# Patient Record
Sex: MALE | Race: WHITE | NOT HISPANIC OR LATINO | URBAN - METROPOLITAN AREA
[De-identification: names, ages, dates, MRNs, and addresses within clinical notes are randomized per-mention and may not be internally consistent; named-entity substitution may affect disease eponyms.]

---

## 2019-07-05 ENCOUNTER — TRANSFERRED RECORDS (OUTPATIENT)
Dept: HEALTH INFORMATION MANAGEMENT | Facility: CLINIC | Age: 34
End: 2019-07-05

## 2019-07-06 ENCOUNTER — TRANSFERRED RECORDS (OUTPATIENT)
Dept: HEALTH INFORMATION MANAGEMENT | Facility: CLINIC | Age: 34
End: 2019-07-06

## 2019-07-08 ENCOUNTER — TELEPHONE (OUTPATIENT)
Dept: UROLOGY | Facility: CLINIC | Age: 34
End: 2019-07-08

## 2019-07-08 ENCOUNTER — MEDICAL CORRESPONDENCE (OUTPATIENT)
Dept: HEALTH INFORMATION MANAGEMENT | Facility: CLINIC | Age: 34
End: 2019-07-08

## 2019-07-08 DIAGNOSIS — Q64.32 CONGENITAL STRICTURE OF URETHRA: Primary | ICD-10-CM

## 2019-07-08 NOTE — TELEPHONE ENCOUNTER
Attempted to contact pt to schedule new consult for urethral stricture with Dr. Schultz next avail but was unable due to phone being disconnected or not in service

## 2019-07-09 ENCOUNTER — PRE VISIT (OUTPATIENT)
Dept: UROLOGY | Facility: CLINIC | Age: 34
End: 2019-07-09

## 2019-07-09 ENCOUNTER — TRANSFERRED RECORDS (OUTPATIENT)
Dept: HEALTH INFORMATION MANAGEMENT | Facility: CLINIC | Age: 34
End: 2019-07-09

## 2019-07-09 NOTE — TELEPHONE ENCOUNTER
MEDICAL RECORDS REQUEST   [facility]  Urology Clinic  [address] [place] [address]  PHONE: [phone]  Fax: [phone]        FUTURE VISIT INFORMATION                                                   Ricky English, : 1985 scheduled for future visit at [facility]    APPOINTMENT INFORMATION:    Date: 19 2PM     Provider:  Glenn Alvarado MD     Reason for Visit/Diagnosis: Urethral stricture     REFERRAL INFORMATION:    Referring provider: Dustin Villafana      Specialty: MD     Referring providers clinic: [facility] contact number:  [phone]    RECORDS REQUESTED FOR VISIT                                                     NOTES  STATUS/DETAILS   OFFICE NOTE from referring provider  yes   OFFICE NOTE from other specialist  yes   DISCHARGE SUMMARY from hospital  yes   DISCHARGE REPORT from the ER  yes   OPERATIVE REPORT  yes   MEDICATION LIST  yes   URETHRAL STRICTURE     RUG (IMAGES & REPORT)  in process   VCUG  (IMAGES & REPORT)  in process       PRE-VISIT CHECKLIST      Record collection complete Yes- [facility] recs scanned in epic and in CE   Images in  PACS    Appointment appropriately scheduled           (right time/right provider) Yes   MyChart activation If no, please explain: in process    Questionnaire complete If no, please explain: In process      Completed by: July Valera

## 2019-07-29 ENCOUNTER — TELEPHONE (OUTPATIENT)
Dept: UROLOGY | Facility: CLINIC | Age: 34
End: 2019-07-29

## 2019-07-29 NOTE — TELEPHONE ENCOUNTER
Health Call Center    Phone Message    May a detailed message be left on voicemail: yes    Reason for Call: Symptoms or Concerns     If patient has red-flag symptoms, warm transfer to triage line    Current symptom or concern: catheter pain around suture site.  PT was in ER yesterday    Symptoms have been present for:  1 day(s)    Has patient previously been seen for this? No            Are there any new or worsening symptoms? Yes: pain is getting worse.  PT has an appt. On 8/5/19 with Christiano but is wondering if he should be seen sooner.  Please follow up.       Action Taken: Message routed to:  [facility] urology

## 2019-07-30 ENCOUNTER — TRANSFERRED RECORDS (OUTPATIENT)
Dept: HEALTH INFORMATION MANAGEMENT | Facility: CLINIC | Age: 34
End: 2019-07-30

## 2019-07-30 ENCOUNTER — PRE VISIT (OUTPATIENT)
Dept: UROLOGY | Facility: CLINIC | Age: 34
End: 2019-07-30

## 2019-07-30 NOTE — TELEPHONE ENCOUNTER
Reason for visit: Urethral stricture consult     Relevant information: pt has a SP tube, referred by Dr. Villafana's office    Records/imaging/labs/orders: all appointments scheduled    Pt called: no need for a call    At Rooming: regular

## 2019-08-04 ASSESSMENT — ENCOUNTER SYMPTOMS
POSTURAL DYSPNEA: 0
HEMOPTYSIS: 0
SNORES LOUDLY: 0
ALTERED TEMPERATURE REGULATION: 0
HEMATURIA: 1
SHORTNESS OF BREATH: 1
WEIGHT LOSS: 0
CHILLS: 0
COUGH DISTURBING SLEEP: 0
POLYDIPSIA: 0
WHEEZING: 0
HALLUCINATIONS: 0
POLYPHAGIA: 0
WEIGHT GAIN: 0
NIGHT SWEATS: 0
FLANK PAIN: 0
FEVER: 0
DYSPNEA ON EXERTION: 0
DIFFICULTY URINATING: 1
DYSURIA: 1
FATIGUE: 0
DECREASED APPETITE: 0
INCREASED ENERGY: 0
SPUTUM PRODUCTION: 0
COUGH: 0

## 2019-08-05 ENCOUNTER — OFFICE VISIT (OUTPATIENT)
Dept: UROLOGY | Facility: CLINIC | Age: 34
End: 2019-08-05
Payer: COMMERCIAL

## 2019-08-05 ENCOUNTER — ANCILLARY PROCEDURE (OUTPATIENT)
Dept: GENERAL RADIOLOGY | Facility: CLINIC | Age: 34
End: 2019-08-05
Attending: UROLOGY
Payer: COMMERCIAL

## 2019-08-05 ENCOUNTER — ALLIED HEALTH/NURSE VISIT (OUTPATIENT)
Dept: UROLOGY | Facility: CLINIC | Age: 34
End: 2019-08-05
Payer: COMMERCIAL

## 2019-08-05 VITALS
RESPIRATION RATE: 16 BRPM | HEART RATE: 98 BPM | SYSTOLIC BLOOD PRESSURE: 143 MMHG | DIASTOLIC BLOOD PRESSURE: 89 MMHG | HEIGHT: 69 IN | WEIGHT: 202 LBS | BODY MASS INDEX: 29.92 KG/M2

## 2019-08-05 DIAGNOSIS — Q64.32 CONGENITAL STRICTURE OF URETHRA: Primary | ICD-10-CM

## 2019-08-05 DIAGNOSIS — Q64.32 CONGENITAL STRICTURE OF URETHRA: ICD-10-CM

## 2019-08-05 DIAGNOSIS — N35.011 POST-TRAUMATIC BULBOUS URETHRAL STRICTURE: ICD-10-CM

## 2019-08-05 DIAGNOSIS — N32.89 BLADDER SPASMS: Primary | ICD-10-CM

## 2019-08-05 PROBLEM — S37.30XA: Status: ACTIVE | Noted: 2019-07-06

## 2019-08-05 PROBLEM — S30.23XA: Status: ACTIVE | Noted: 2019-07-05

## 2019-08-05 PROBLEM — Z86.0100 HX OF COLONIC POLYP: Status: ACTIVE | Noted: 2017-08-17

## 2019-08-05 PROBLEM — R12 HEARTBURN: Status: ACTIVE | Noted: 2019-02-01

## 2019-08-05 PROBLEM — S39.83XA PELVIC STRADDLE INJURY: Status: ACTIVE | Noted: 2019-07-05

## 2019-08-05 RX ORDER — OMEPRAZOLE 40 MG/1
CAPSULE, DELAYED RELEASE ORAL
Refills: 3 | COMMUNITY
Start: 2019-06-17

## 2019-08-05 RX ORDER — SODIUM PHOSPHATE,MONO-DIBASIC 19G-7G/118
ENEMA (ML) RECTAL
COMMUNITY
Start: 2017-01-01

## 2019-08-05 RX ORDER — LIDOCAINE HYDROCHLORIDE 20 MG/ML
10 JELLY TOPICAL ONCE
Status: COMPLETED | OUTPATIENT
Start: 2019-08-05 | End: 2019-08-05

## 2019-08-05 RX ORDER — MULTIVITAMIN
TABLET ORAL
COMMUNITY
Start: 2017-01-01

## 2019-08-05 RX ORDER — SECUKINUMAB 150 MG/ML
INJECTION SUBCUTANEOUS
Refills: 10 | COMMUNITY
Start: 2019-06-18

## 2019-08-05 RX ORDER — CHLORAL HYDRATE 500 MG
CAPSULE ORAL
COMMUNITY
Start: 2017-01-01

## 2019-08-05 RX ORDER — TOLTERODINE TARTRATE 1 MG/1
1 TABLET, EXTENDED RELEASE ORAL 2 TIMES DAILY PRN
Qty: 120 TABLET | Refills: 0 | Status: SHIPPED | OUTPATIENT
Start: 2019-08-05 | End: 2019-09-23

## 2019-08-05 RX ADMIN — LIDOCAINE HYDROCHLORIDE 10 ML: 20 JELLY TOPICAL at 10:48

## 2019-08-05 ASSESSMENT — PATIENT HEALTH QUESTIONNAIRE - PHQ9
SUM OF ALL RESPONSES TO PHQ QUESTIONS 1-9: 3
SUM OF ALL RESPONSES TO PHQ QUESTIONS 1-9: 3

## 2019-08-05 ASSESSMENT — MIFFLIN-ST. JEOR: SCORE: 1836.65

## 2019-08-05 NOTE — PROGRESS NOTES
Urology Clinic    Glenn Schultz MD  Date of Service: 2019     Name: Ricky English  MRN: [id number]  Age: 34 year old  : 1985  Referring provider: Ash Villafana     Assessment and Plan:    Assessment:   A 34 year old-year-old gentleman with history of straddle injury sustained on 19 with resultant bulbar urethra distraction injury s/p SPT placement after failed catheterization attempt.     Plan:   - Will continue SPT and plan for urethroplasty to repair bulbar urethral injury. Recommended delaying any definitive management for ~ 3 months from initial injury to optimize results.   - Patient does report history of psoriatic arthritis for which he takes bi-weekly biologic medication. He has been holding this since time of injury and we recommend continuing to hold this medication. Patient will discuss this with rheumatologist.   - Patient reassured that he should not have any long term issues with fertility due to this injury and planned repair.   - Provided patient with anti-cholinergic Rx for reported bladder spasms.       Risks, benefits, and alternatives of repeat urethrotomy versus urethroplasty were described.  He wishes to proceed with urethroplasty.  He understands the risks to include but not be limited to bleeding, infection, penile pain or numbness, scrotal pain or numbness, lower extremity neuropathy, change in erectile function, change in ejaculatory function, and need for additional procedures. He understands the success rate of urethroplasty to be about 99% for this indication.     Additional Risk Factors in this case/surgery: None    Bryson Reynolds MD   Urology Resident, PGY-4     =======================================================  As the attending surgeon I, Glenn Schultz, interviewed and examined the patient. The plan was developed between me and the patient. My findings and plan are as stated by the resident.    Glenn Schultz,  MD      ---------------------------------------------------------------------------------------------------------------------    Chief Complaint:   Urethral stricture     HPI:   Mr. English is a 34 year old-year-old man seen in consultation from Dr. Villafana for a bulbar urethral injury. The patient was involved in an accident on 7/6/19 where he fell onto a floor joist on a deck he was working on. He did not have any significant hematoma or bony injury initially however he was unable to void and presented to the ED for further hospitalization. There was an attempt made to catheterize patient under cystoscopy however this was unsuccessful and a SPT was placed. He subsequently developed perineal and left thigh hematoma which has since largely resolved. He denies issues with SPT drainage but notes irritation due to suture around catheter (16-Fr silicone catheter). He reports occasional hematuria and intermittent bladder spasms/leakage around SPT. With regards to his erectile function, he reports adequate erection however his glans does not become engorged. Of note, he does report history of apparent weak stream prior to injury.  Of note, patient also has history of psoriatic arthritis for which he takes a bi-weekly biologic medication which has been held since time of injury.    Etiology:  He denies a history of sexually transmitted diseases. He confirms a history of straddle injury as above. He denies a history of pelvic fracture. He denies a history of urethral catheterization or instrumentation.    REVIEW OF QUESTIONNAIRES:  Questionnaires reviewed. See flowsheet for details.      Review of Systems:   Pertinent items are noted in HPI or as below, remainder of complete ROS is negative.      Active Medications:     Current Outpatient Medications:      cholecalciferol (VITAMIN D3) 400 unit (10 mcg) TABS tablet, , Disp: , Rfl:      fish oil-omega-3 fatty acids 1000 MG capsule, , Disp: , Rfl:       "glucosamine-chondroitin (GLUCOSAMINE CHONDR COMPLEX) 500-400 MG CAPS per capsule, , Disp: , Rfl:      Multiple Vitamin (DAILY VITAMINS) TABS, , Disp: , Rfl:      omeprazole (PRILOSEC) 40 MG DR capsule, TK 1 C PO QD IN THE MORNING OES, Disp: , Rfl: 3     tolterodine (DETROL) 1 MG tablet, Take 1 tablet (1 mg) by mouth 2 times daily as needed for bladder spasms, Disp: 120 tablet, Rfl: 0     COSENTYX SENSOREADY 300 DOSE 150 MG/ML Sensoready pen, , Disp: , Rfl: 10  No current facility-administered medications for this visit.       Allergies:   Ceclor [cefaclor] and Tegretol [carbamazepine]      Past Medical History:  Heartburn   Colonic polyp   Pelvic straddle injury   Perineal hematoma   Psoriatic arthritis   Urethral injury      Past Surgical History:  Cystoscopy, SPT placement 07/06/2019     Family History:   No past pertinent family history.     Social History:   Works as a   , no children      Physical Exam:   General:  Well-dressed, well-nourished man in no acute distress.  Vitals: BP (!) 143/89   Pulse 98   Resp 16   Ht 1.753 m (5' 9\")   Wt 91.6 kg (202 lb)   BMI 29.83 kg/m   Estimated body mass index is 29.83 kg/m  as calculated from the following:    Height as of this encounter: 1.753 m (5' 9\").    Weight as of this encounter: 91.6 kg (202 lb).  Eyes: anicteric, EOMI  Lymph: No cervical, supraclavicular or axillary lymphadenopathy  Lungs:  No respiratory distress.  Heart:  Regular rate and rhythm.     Abdomen:  mildly obese, soft, nontender, without masses.  There are not surgical scars.    :  Penis is circumcised. Meatal stenosis is absent, penile plaques are absent. Some perineal hematoma and associated hematoma at the left thigh. Testes are 10 mL bilaterally without masses.  Rectal examination was not done.   Musculoskeletal:  Motor strength is excellent throughout.     Neurologic:  Grossly nonfocal.     Back: Flanks are nontender.    Imaging:   Retrograde urethrogram and voiding " cystourethrogram were performed earlier and the images were independently interpreted by me and are reviewed with the patient.  These demonstrate a urethral distraction injury at the mid to proximal bulbar urethra. The bladder contour is normal without diverticulae.    Review of Office Studies:    Review of RUG/VCUG with above results    Review of Outside Records:  I reviewed outside records for 10 minutes.  Findings include:  Attempt at urethral catheterization and subsequent placement of SPT on 7/6/19.     Scribe Disclosure:    I, Isadora Gordon, a scribe, prepared the chart for today's encounter.

## 2019-08-05 NOTE — LETTER
2019       RE: Ricky English  [address]  [facility] [address]     Dear Colleague,    Thank you for referring your patient, Ricky English, to the [facility] UROLOGY AND INST FOR PROSTATE AND UROLOGIC CANCERS at [facility]. Please see a copy of my visit note below.      Urology Clinic    Glenn Schultz MD  Date of Service: 2019     Name: Ricky Engilsh  MRN: [id number]  Age: 34 year old  : 1985  Referring provider: Ash Villafana     Assessment and Plan:    Assessment:   A 34 year old-year-old gentleman with history of straddle injury sustained on 19 with resultant bulbar urethra distraction injury s/p SPT placement after failed catheterization attempt.     Plan:   - Will continue SPT and plan for urethroplasty to repair bulbar urethral injury. Recommended delaying any definitive management for ~ 3 months from initial injury to optimize results.   - Patient does report history of psoriatic arthritis for which he takes bi-weekly biologic medication. He has been holding this since time of injury and we recommend continuing to hold this medication. Patient will discuss this with rheumatologist.   - Patient reassured that he should not have any long term issues with fertility due to this injury and planned repair.   - Provided patient with anti-cholinergic Rx for reported bladder spasms.       Risks, benefits, and alternatives of repeat urethrotomy versus urethroplasty were described.  He wishes to proceed with urethroplasty.  He understands the risks to include but not be limited to bleeding, infection, penile pain or numbness, scrotal pain or numbness, lower extremity neuropathy, change in erectile function, change in ejaculatory function, and need for additional procedures. He understands the success rate of urethroplasty to be about 99% for this indication.     Additional Risk Factors in this case/surgery: None    Bryson Reynolds MD    Urology Resident, PGY-4     =======================================================  As the attending surgeon I, Glenn Schultz, interviewed and examined the patient. The plan was developed between me and the patient. My findings and plan are as stated by the resident.    Glenn Schultz MD      ---------------------------------------------------------------------------------------------------------------------    Chief Complaint:   Urethral stricture     HPI:   Mr. English is a 34 year old-year-old man seen in consultation from Dr. Villafana for a bulbar urethral injury. The patient was involved in an accident on 7/6/19 where he fell onto a floor joist on a deck he was working on. He did not have any significant hematoma or bony injury initially however he was unable to void and presented to the ED for further hospitalization. There was an attempt made to catheterize patient under cystoscopy however this was unsuccessful and a SPT was placed. He subsequently developed perineal and left thigh hematoma which has since largely resolved. He denies issues with SPT drainage but notes irritation due to suture around catheter (16-Fr silicone catheter). He reports occasional hematuria and intermittent bladder spasms/leakage around SPT. With regards to his erectile function, he reports adequate erection however his glans does not become engorged. Of note, he does report history of apparent weak stream prior to injury.  Of note, patient also has history of psoriatic arthritis for which he takes a bi-weekly biologic medication which has been held since time of injury.    Etiology:  He denies a history of sexually transmitted diseases. He confirms a history of straddle injury as above. He denies a history of pelvic fracture. He denies a history of urethral catheterization or instrumentation.    REVIEW OF QUESTIONNAIRES:  Questionnaires reviewed. See flowsheet for details.      Review of Systems:   Pertinent items are  "noted in HPI or as below, remainder of complete ROS is negative.      Active Medications:     Current Outpatient Medications:      cholecalciferol (VITAMIN D3) 400 unit (10 mcg) TABS tablet, , Disp: , Rfl:      fish oil-omega-3 fatty acids 1000 MG capsule, , Disp: , Rfl:      glucosamine-chondroitin (GLUCOSAMINE CHONDR COMPLEX) 500-400 MG CAPS per capsule, , Disp: , Rfl:      Multiple Vitamin (DAILY VITAMINS) TABS, , Disp: , Rfl:      omeprazole (PRILOSEC) 40 MG DR capsule, TK 1 C PO QD IN THE MORNING OES, Disp: , Rfl: 3     tolterodine (DETROL) 1 MG tablet, Take 1 tablet (1 mg) by mouth 2 times daily as needed for bladder spasms, Disp: 120 tablet, Rfl: 0     COSENTYX SENSOREADY 300 DOSE 150 MG/ML Sensoready pen, , Disp: , Rfl: 10  No current facility-administered medications for this visit.       Allergies:   Ceclor [cefaclor] and Tegretol [carbamazepine]      Past Medical History:  Heartburn   Colonic polyp   Pelvic straddle injury   Perineal hematoma   Psoriatic arthritis   Urethral injury      Past Surgical History:  Cystoscopy, SPT placement 07/06/2019     Family History:   No past pertinent family history.     Social History:   Works as a   , no children      Physical Exam:   General:  Well-dressed, well-nourished man in no acute distress.  Vitals: BP (!) 143/89   Pulse 98   Resp 16   Ht 1.753 m (5' 9\")   Wt 91.6 kg (202 lb)   BMI 29.83 kg/m    Estimated body mass index is 29.83 kg/m  as calculated from the following:    Height as of this encounter: 1.753 m (5' 9\").    Weight as of this encounter: 91.6 kg (202 lb).  Eyes: anicteric, EOMI  Lymph: No cervical, supraclavicular or axillary lymphadenopathy  Lungs:  No respiratory distress.  Heart:  Regular rate and rhythm.     Abdomen:  mildly obese, soft, nontender, without masses.  There are not surgical scars.    :  Penis is circumcised. Meatal stenosis is absent, penile plaques are absent. Some perineal hematoma and associated " hematoma at the left thigh. Testes are 10 mL bilaterally without masses.  Rectal examination was not done.   Musculoskeletal:  Motor strength is excellent throughout.     Neurologic:  Grossly nonfocal.     Back: Flanks are nontender.    Imaging:   Retrograde urethrogram and voiding cystourethrogram were performed earlier and the images were independently interpreted by me and are reviewed with the patient.  These demonstrate a urethral distraction injury at the mid to proximal bulbar urethra. The bladder contour is normal without diverticulae.    Review of Office Studies:    Review of RUG/VCUG with above results    Review of Outside Records:  I reviewed outside records for 10 minutes.  Findings include:  Attempt at urethral catheterization and subsequent placement of SPT on 7/6/19.     Scribe Disclosure:    I, Isadora Gordon, a scribe, prepared the chart for today's encounter.       Again, thank you for allowing me to participate in the care of your patient.      Sincerely,    Glenn Schultz MD

## 2019-08-05 NOTE — PROGRESS NOTES
Pre Op Teaching Flowsheet       Pre and Post op Patient Education  Relevant Diagnosis:  Urethral stricture  Teaching Topic:  Pre and post op teaching for Posterior urethroplasty (no buccal graft planned)  Person Involved in teaching:  Ricky English      Motivation Level:  Asks Questions: Yes  Eager to Learn:  Yes  Cooperative: Yes  Receptive (willing/able to accept information):  Yes  Patient demonstrates understanding of the following:  Date and time of surgery:  10/2/19  Location of surgery: [facility] [place]- 5th Floor  History and Physical and any other testing necessary prior to surgery: Yes  Required time line for completion of History and Physical and any pre-op testing: Yes    NPO Guidelines: NPO per Anesthesia Guidelines    Patient demonstrates understanding of the following:  Patient understands the need for a responsible adult to drive them home and someone to stay with them for the first 24 hours post-operatively: YES Wife  Pre-op bowel prep: No, not needed  Pre-op showering/scrub information with Hibiclens Soap: Yes  Medications to take the day of surgery:  Per PCP  Blood thinner medications discussed and when to stop (if applicable):  Yes  Diabetes medication management (if applicable):  N/A  Discussed pain control after surgery: pain scale, pain medications and pain management techniques  Infection Prevention: Patient demonstrates understanding of the following:  Patient instructed on hand hygiene:  Yes  Surgical procedure site care taught: Yes  Signs and symptoms of infection taught:  Yes  Wound care will be taught at the time of discharge.  Central venous catheter care will be taught at the time of discharge (if applicable).    Post-op follow-up:  Discussed how to contact the hospital, nurse, and clinic scheduling staff if necessary.    Instructional materials used/given/mailed:  [facility] Booklet, post op teaching sheet, Map, Soap, and arrival/location  information.    Surgical instructions given to patient in clinic: Yes.    Instructional Materials given:  Before your surgery packet , Medications to avoid before surgery , Showering or Bathing instructions before surgery  and What to expect after surgery    Post-op appointment/testing scheduled per MD orders: Yes    Total time with patient: 10 minutes    Katelyn Huerta RN, BSN  Urology Care Coordinator

## 2019-08-07 ENCOUNTER — PRE VISIT (OUTPATIENT)
Dept: UROLOGY | Facility: CLINIC | Age: 34
End: 2019-08-07

## 2019-08-07 NOTE — TELEPHONE ENCOUNTER
Reason for Visit: SP tube exchange    Diagnosis: Urethral stricture     Orders/Procedures/Records: Records available    Contact Patient: N/A    Rooming Requirements: Catheter supplies      Ibeth Ferreira  08/07/19  6:48 AM

## 2019-08-12 ENCOUNTER — TELEPHONE (OUTPATIENT)
Dept: UROLOGY | Facility: CLINIC | Age: 34
End: 2019-08-12

## 2019-08-12 NOTE — TELEPHONE ENCOUNTER
Patient states that his symptoms was chills on 8/9/2019 that subsided by 8/10/2019. He states that he had 4 alcoholic drinks over the weekend. His urine is darker and smelly. Patient was instructed to push fluids,watch for blood in his urine and not to have any alcoholic beverages (per Katelyn Huerta CC for Dr. Glenn Schultz). He will call us back if his symptoms worsen. A fever over 100.0 or if he see blood in his urine.Patient agreed with the plan.      Lashawn Marin MA

## 2019-08-12 NOTE — TELEPHONE ENCOUNTER
[facility] Call Center    Phone Message    May a detailed message be left on voicemail: yes    Reason for Call: Symptoms or Concerns     If patient has red-flag symptoms, warm transfer to triage line    Current symptom or concern: The pt thinks he might have a UTI but wants to speak with a nurse to discuss. Thanks.    Symptoms have been present for: ? hour(s)    Has patient previously been seen for this? No    By : Antoine    Date: 8.5.19    Are there any new or worsening symptoms? Yes: ?Pt wouldn't say      Action Taken: Message routed to:  [facility]

## 2019-08-15 ENCOUNTER — TELEPHONE (OUTPATIENT)
Dept: UROLOGY | Facility: CLINIC | Age: 34
End: 2019-08-15

## 2019-08-15 NOTE — TELEPHONE ENCOUNTER
Patient called and was having alittle blood this morning when he was getting ready for surgery.  He has a new supra pubic catheter and has many good questions about care and what his body is going thru.  He states seeing more sediment and told him if it becomes a problem and causes many episodes of spasm we would probably teach him irrigation Adilene Albert LPN Staff Nurse

## 2019-08-15 NOTE — TELEPHONE ENCOUNTER
Health Call Center    Phone Message    May a detailed message be left on voicemail: yes    Reason for Call: Other: Patient is calling back with a follow up.  He is still having symptoms and is requesting a call back from the nurse.  He states has seen a little blood.       Action Taken: Message routed to:  [facility] Urology

## 2019-08-18 NOTE — PROGRESS NOTES
"Reason for visit:  Follow up of urethral injury, SP change    HPI:  Mr. English is a 34M involved in an accident on 7/6/19 where he fell. He did not have any significant hematoma or bony injury initially however he was unable to void.  Presented to outside hospital ER.   Attempt made to catheterize patient with aid of cystoscopy however this was unsuccessful and a SPT was placed.   He subsequently developed perineal and left thigh hematoma.   Referred to Dr. Schultz 8/5/19  He is scheduled for urethroplasty in October with Dr. Schultz.  Here today for exchange of suprapubic tube.   No issues with SP tube.       Past medical, surgical, family, social, allergic, and medication history reviewed  ROS -see hpi otherwise rest is negative     OBJECTIVE:  Vitals:    08/19/19 1426   BP: 119/83   Pulse: 90   Weight: 91.6 kg (202 lb)   Height: 1.753 m (5' 9\")     EXAM:  General:  Well-dressed, well-nourished man in no acute distress  Eyes: anicteric, EOMI  Lungs:  No respiratory distress.  Heart:  Regular rate   Abdomen:  not obese, soft, nontender, without masses.     : SP site c/d/i, 16F espinoza in SP site  Musculoskeletal:  Motor strength is excellent throughout.     Neurologic:  Grossly nonfocal.     Back: Flanks are nontender.    PROCEDURE:  Patients abdomen was prepped and draped in sterile fashion. An angled glide wire was passed through old catheter into the bladder. The old catheter was removed and a new 16F Jackson catheter was passed over the wire into the bladder with return of yellow urine. The balloon was inflated with 10cc sterile water. The bladder was irrigated with sterile saline via 60cc syringe until clear of debris. Tolerated well.     Assessment/Plan: Ricky English is a 34 year old male with urethral distraction after a fall 7/6/19. Could not perform primary realignment at outside facility so SP tube placed. Planning for urethroplasty in October.     --SP changed today  --RTC in 1 month for SP change " prior to urethroplasty and urine culture      Luz Elena Chambers MD  Reconstructive Urology Fellow

## 2019-08-19 ENCOUNTER — OFFICE VISIT (OUTPATIENT)
Dept: UROLOGY | Facility: CLINIC | Age: 34
End: 2019-08-19
Payer: COMMERCIAL

## 2019-08-19 VITALS
SYSTOLIC BLOOD PRESSURE: 119 MMHG | HEART RATE: 90 BPM | HEIGHT: 69 IN | WEIGHT: 202 LBS | BODY MASS INDEX: 29.92 KG/M2 | DIASTOLIC BLOOD PRESSURE: 83 MMHG

## 2019-08-19 DIAGNOSIS — Z93.59 SUPRAPUBIC CATHETER (H): ICD-10-CM

## 2019-08-19 DIAGNOSIS — N35.011 POST-TRAUMATIC BULBOUS URETHRAL STRICTURE: Primary | ICD-10-CM

## 2019-08-19 ASSESSMENT — PAIN SCALES - GENERAL: PAINLEVEL: NO PAIN (0)

## 2019-08-19 ASSESSMENT — MIFFLIN-ST. JEOR: SCORE: 1836.65

## 2019-08-19 NOTE — LETTER
"8/19/2019       RE: Ricky English  [address]  [facility] [address]     Dear Colleague,    Thank you for referring your patient, Ricky English, to the [facility] UROLOGY AND INST FOR PROSTATE AND UROLOGIC CANCERS at [facility]. Please see a copy of my visit note below.    Reason for visit:  Follow up of urethral injury, SP change    HPI:  Mr. English is a 34M involved in an accident on 7/6/19 where he fell. He did not have any significant hematoma or bony injury initially however he was unable to void.  Presented to outside hospital ER.   Attempt made to catheterize patient with aid of cystoscopy however this was unsuccessful and a SPT was placed.   He subsequently developed perineal and left thigh hematoma.   Referred to Dr. Schultz 8/5/19  He is scheduled for urethroplasty in October with Dr. Schultz.  Here today for exchange of suprapubic tube.   No issues with SP tube.       Past medical, surgical, family, social, allergic, and medication history reviewed  ROS -see hpi otherwise rest is negative     OBJECTIVE:  Vitals:    08/19/19 1426   BP: 119/83   Pulse: 90   Weight: 91.6 kg (202 lb)   Height: 1.753 m (5' 9\")     EXAM:  General:  Well-dressed, well-nourished man in no acute distress  Eyes: anicteric, EOMI  Lungs:  No respiratory distress.  Heart:  Regular rate   Abdomen:   not obese, soft, nontender, without masses.     : SP site c/d/i, 16F espinoza in SP site  Musculoskeletal:  Motor strength is excellent throughout.     Neurologic:  Grossly nonfocal.     Back: Flanks are nontender.    PROCEDURE:  Patients abdomen was prepped and draped in sterile fashion. An angled glide wire was passed through old catheter into the bladder. The old catheter was removed and a new 16F [place] catheter was passed over the wire into the bladder with return of yellow urine. The balloon was inflated with 10cc sterile water. The bladder was irrigated with sterile saline via 60cc syringe " until clear of debris. Tolerated well.     Assessment/Plan: Ricky English is a 34 year old male with urethral distraction after a fall 7/6/19. Could not perform primary realignment at outside facility so SP tube placed. Planning for urethroplasty in October.     --SP changed today  --RTC in 1 month for SP change prior to urethroplasty and urine culture      Luz Elena Chambers MD  Reconstructive Urology Fellow    Again, thank you for allowing me to participate in the care of your patient.      Sincerely,    Nuha Chambers MD

## 2019-08-19 NOTE — NURSING NOTE
"Chief Complaint   Patient presents with     Follow Up     SP tube exchange       Blood pressure 119/83, pulse 90, height 1.753 m (5' 9\"), weight 91.6 kg (202 lb). Body mass index is 29.83 kg/m .    Patient Active Problem List   Diagnosis     Heartburn     Hx of colonic polyp     Pelvic straddle injury     Perineal hematoma     Psoriatic arthritis (H)     Urethral injury, closed, initial encounter       Allergies   Allergen Reactions     Ceclor [Cefaclor]      Tegretol [Carbamazepine]        Current Outpatient Medications   Medication Sig Dispense Refill     cholecalciferol (VITAMIN D3) 400 unit (10 mcg) TABS tablet        COSENTYX SENSOREADY 300 DOSE 150 MG/ML Sensoready pen   10     fish oil-omega-3 fatty acids 1000 MG capsule        glucosamine-chondroitin (GLUCOSAMINE CHONDR COMPLEX) 500-400 MG CAPS per capsule        Multiple Vitamin (DAILY VITAMINS) TABS        omeprazole (PRILOSEC) 40 MG DR capsule TK 1 C PO QD IN THE MORNING OES  3     tolterodine (DETROL) 1 MG tablet Take 1 tablet (1 mg) by mouth 2 times daily as needed for bladder spasms 120 tablet 0       Social History     Tobacco Use     Smoking status: Never Smoker     Smokeless tobacco: Never Used   Substance Use Topics     Alcohol use: None     Drug use: None       Ibeth Ferreira, EMT  8/19/2019  2:28 PM      The following medication was given:     MEDICATION:  Ciprofloxacin  ROUTE: PO  SITE: mouth  DOSE: 500 mg  LOT #: [id number]  : [employer]  EXPIRATION DATE: 10/2020  NDC#: 01 003 [id number] 11 2   Was there drug waste? No    Prior to med admin, verified patient identity using patient's name and date of birth.  Due to med administration, patient instructed to remain in clinic for 15 minutes  afterwards, and to report any adverse reaction to me immediately.    Drug Amount Wasted:  None.    Ibeth Ferreira, TWIN  August 19, 2019       "

## 2019-08-19 NOTE — PATIENT INSTRUCTIONS
Please schedule an appointment in one month for an SP tube change.       It was a pleasure meeting with you today.  Thank you for allowing me and my team the privilege of caring for you today.  YOU are the reason we are here, and I truly hope we provided you with the excellent service you deserve.  Please let us know if there is anything else we can do for you so that we can be sure you are leaving completely satisfied with your care experience.

## 2019-09-18 ENCOUNTER — TELEPHONE (OUTPATIENT)
Dept: UROLOGY | Facility: CLINIC | Age: 34
End: 2019-09-18

## 2019-09-18 NOTE — TELEPHONE ENCOUNTER
Health Call Center    Phone Message    May a detailed message be left on voicemail: yes    Reason for Call: Other: PT is wanting to know from Dr. Glenn Kim for medication of DETROL 1mg tab, pt is experincing spams still, medication works for up to 6 hrs or so, after that the spams come back and they are getting worse, Pt is wondeirng what Dr. Glenn Kim  would recommend to help with this issue, or could pt increase the doseage of medicaton from 2xs a day to 4x a day? Please call the pt back, thank you     Action Taken: Message routed to:  [facility] ([facility] urology

## 2019-09-19 NOTE — TELEPHONE ENCOUNTER
Left patient message to call back to discuss.    Katelyn Huerta, RN, BSN  Care Coordinator- Reconstructive Urology

## 2019-09-20 ENCOUNTER — TELEPHONE (OUTPATIENT)
Dept: UROLOGY | Facility: CLINIC | Age: 34
End: 2019-09-20

## 2019-09-20 NOTE — TELEPHONE ENCOUNTER
----- Message from Nuha Chambers MD sent at 9/20/2019 11:12 AM CDT -----  Regarding: RE: Bladder Spasms  Can increase his Detrol dose.     ----- Message -----  From: Katelyn Huerta RN  Sent: 9/19/2019   1:56 PM  To: Nuha Chambers MD  Subject: Bladder Spasms                                   Hi Dr. Chambers,    Pt has SP in and he continues to have intermittent bladder spasms while on Detrol 1 mg BID.  He's scheduled for his urethroplasty 10/2, would you like to increase his Detrol or try him on oxybutynin?    Thanks,  Katelyn

## 2019-09-20 NOTE — TELEPHONE ENCOUNTER
Called patient and left message to increase his detrol to 2 mg twice per day to help with bladder spasms  He was given 120 pills so told him to call if he needs more called into his pharmacy Adilene Albert LPN Staff Nurse

## 2019-09-23 ENCOUNTER — HOSPITAL ENCOUNTER (OUTPATIENT)
Dept: GENERAL RADIOLOGY | Facility: CLINIC | Age: 34
End: 2019-09-23
Attending: UROLOGY
Payer: COMMERCIAL

## 2019-09-23 ENCOUNTER — ALLIED HEALTH/NURSE VISIT (OUTPATIENT)
Dept: UROLOGY | Facility: CLINIC | Age: 34
End: 2019-09-23
Payer: COMMERCIAL

## 2019-09-23 ENCOUNTER — HOSPITAL ENCOUNTER (OUTPATIENT)
Dept: GENERAL RADIOLOGY | Facility: CLINIC | Age: 34
Discharge: HOME | End: 2019-09-23
Attending: UROLOGY | Admitting: UROLOGY
Payer: COMMERCIAL

## 2019-09-23 DIAGNOSIS — N35.011 POST-TRAUMATIC BULBOUS URETHRAL STRICTURE: Primary | ICD-10-CM

## 2019-09-23 DIAGNOSIS — Z93.59 SUPRAPUBIC CATHETER (H): ICD-10-CM

## 2019-09-23 DIAGNOSIS — Q64.32 CONGENITAL STRICTURE OF URETHRA: ICD-10-CM

## 2019-09-23 DIAGNOSIS — N32.89 BLADDER SPASMS: ICD-10-CM

## 2019-09-23 LAB
ALBUMIN UR-MCNC: 100 MG/DL
APPEARANCE UR: ABNORMAL
BACTERIA #/AREA URNS HPF: ABNORMAL /HPF
BILIRUB UR QL STRIP: NEGATIVE
COLOR UR AUTO: YELLOW
GLUCOSE UR STRIP-MCNC: NEGATIVE MG/DL
HGB UR QL STRIP: ABNORMAL
KETONES UR STRIP-MCNC: NEGATIVE MG/DL
LEUKOCYTE ESTERASE UR QL STRIP: ABNORMAL
MUCOUS THREADS #/AREA URNS LPF: PRESENT /LPF
NITRATE UR QL: NEGATIVE
PH UR STRIP: 9 PH (ref 5–7)
RBC #/AREA URNS AUTO: 26 /HPF (ref 0–2)
SOURCE: ABNORMAL
SP GR UR STRIP: 1.02 (ref 1–1.03)
UROBILINOGEN UR STRIP-MCNC: 0 MG/DL (ref 0–2)
WBC #/AREA URNS AUTO: >182 /HPF (ref 0–5)
WBC CLUMPS #/AREA URNS HPF: PRESENT /HPF

## 2019-09-23 PROCEDURE — 51600 INJECTION FOR BLADDER X-RAY: CPT

## 2019-09-23 PROCEDURE — 25000125 ZZHC RX 250: Performed by: RADIOLOGY

## 2019-09-23 PROCEDURE — 25500064 ZZH RX 255 OP 636: Performed by: RADIOLOGY

## 2019-09-23 PROCEDURE — 74450 X-RAY URETHRA/BLADDER: CPT

## 2019-09-23 RX ORDER — IOPAMIDOL 510 MG/ML
100 INJECTION, SOLUTION INTRAVASCULAR ONCE
Status: COMPLETED | OUTPATIENT
Start: 2019-09-23 | End: 2019-09-23

## 2019-09-23 RX ORDER — LIDOCAINE HYDROCHLORIDE 20 MG/ML
JELLY TOPICAL ONCE
Status: COMPLETED | OUTPATIENT
Start: 2019-09-23 | End: 2019-09-23

## 2019-09-23 RX ORDER — IOPAMIDOL 612 MG/ML
50 INJECTION, SOLUTION INTRAVASCULAR ONCE
Status: COMPLETED | OUTPATIENT
Start: 2019-09-23 | End: 2019-09-23

## 2019-09-23 RX ORDER — TOLTERODINE TARTRATE 1 MG/1
1 TABLET, EXTENDED RELEASE ORAL 2 TIMES DAILY PRN
Qty: 120 TABLET | Refills: 0 | Status: SHIPPED | OUTPATIENT
Start: 2019-09-23 | End: 2019-12-02

## 2019-09-23 RX ORDER — CIPROFLOXACIN 500 MG/1
500 TABLET, FILM COATED ORAL ONCE
Status: COMPLETED | OUTPATIENT
Start: 2019-09-23 | End: 2019-09-23

## 2019-09-23 RX ADMIN — IOPAMIDOL 200 ML: 510 INJECTION, SOLUTION INTRAVASCULAR at 08:48

## 2019-09-23 RX ADMIN — LIDOCAINE HYDROCHLORIDE 4 ML: 20 JELLY TOPICAL at 08:45

## 2019-09-23 RX ADMIN — CIPROFLOXACIN 500 MG: 500 TABLET, FILM COATED ORAL at 10:05

## 2019-09-23 RX ADMIN — IOPAMIDOL 15 ML: 612 INJECTION, SOLUTION INTRAVENOUS at 08:44

## 2019-09-23 NOTE — PATIENT INSTRUCTIONS
Urine sent for UA/UC today.    Surgery is scheduled with Dr. Schultz on 10-2-19.    It was a pleasure meeting with you today.  Thank you for allowing me and my team the privilege of caring for you today.  YOU are the reason we are here, and I truly hope we provided you with the excellent service you deserve.  Please let us know if there is anything else we can do for you so that we can be sure you are leaving completely satisfied with your care experience.        Kayla Prabhakar, CMA

## 2019-09-23 NOTE — PROGRESS NOTES
Chief Complaint   Patient presents with     Allied Health Visit     SPT change       Patient Active Problem List   Diagnosis     Heartburn     Hx of colonic polyp     Pelvic straddle injury     Perineal hematoma     Psoriatic arthritis (H)     Urethral injury, closed, initial encounter       Allergies   Allergen Reactions     Ceclor [Cefaclor]      Tegretol [Carbamazepine]        Current Outpatient Medications   Medication Sig Dispense Refill     cholecalciferol (VITAMIN D3) 400 unit (10 mcg) TABS tablet        COSENTYX SENSOREADY 300 DOSE 150 MG/ML Sensoready pen   10     fish oil-omega-3 fatty acids 1000 MG capsule        glucosamine-chondroitin (GLUCOSAMINE CHONDR COMPLEX) 500-400 MG CAPS per capsule        Multiple Vitamin (DAILY VITAMINS) TABS        omeprazole (PRILOSEC) 40 MG DR capsule TK 1 C PO QD IN THE MORNING OES  3     tolterodine (DETROL) 1 MG tablet Take 1 tablet (1 mg) by mouth 2 times daily as needed for bladder spasms 120 tablet 0       Social History     Tobacco Use     Smoking status: Never Smoker     Smokeless tobacco: Never Used   Substance Use Topics     Alcohol use: Not on file     Drug use: Not on file       Ricky English comes into clinic today at the request of Dr. Glenn Schultz for SP catheter change.    This service provided today was under the direct supervision of Dr. Del Crystal, who was available if needed.    Ricky English presents to clinic for scheduled [Yes] catheter exchange.  Order has been verified: Yes.    Removal:  16 Fr straight tipped latex espinoza catheter removed from suprapubic meatus without difficulty.    Insertion:  16 Fr straight tipped latex espinoza catheter inserted into suprapubic meatus in the usual sterile fashion without difficulty.  Balloon filled with 7 mL sterile H2O.  Received 25 ml yellow urine output.   Catheter secured in place with leg strap: Yes.     One Cipro 500 mg given per protocol: Yes.   The following medication was given:      MEDICATION:  Ciprofloxacin  ROUTE: PO  SITE: Medication was given orally   DOSE: 500 mg  LOT #: [id number]  : [employer]  EXPIRATION DATE: 10/2020  NDC#: [id number]   Was there drug waste? No    Prior to medication administration, verified patient identity using patient's name and date of birth.  Due to medication administration, patient instructed to remain in clinic for 15 minutes  afterwards, and to report any adverse reaction to me immediately.    Drug Amount Wasted:  None.  Vial/Syringe: Single dose vial    Patient did tolerate procedure well.    Patient instructed as to where to call or go for pain, fever, leakage, or decreased urine flow.       MAREK Galeano  9/23/2019  10:03 AM

## 2019-09-26 LAB
BACTERIA SPEC CULT: ABNORMAL
Lab: ABNORMAL
SPECIMEN SOURCE: ABNORMAL

## 2019-09-27 ENCOUNTER — PATIENT OUTREACH (OUTPATIENT)
Dept: UROLOGY | Facility: CLINIC | Age: 34
End: 2019-09-27

## 2019-09-27 DIAGNOSIS — N39.0 URINARY TRACT INFECTION: Primary | ICD-10-CM

## 2019-09-27 RX ORDER — NITROFURANTOIN 25; 75 MG/1; MG/1
100 CAPSULE ORAL 2 TIMES DAILY
Qty: 6 CAPSULE | Refills: 0 | Status: SHIPPED | OUTPATIENT
Start: 2019-09-29 | End: 2019-10-02

## 2019-09-27 RX ORDER — CIPROFLOXACIN 500 MG/1
500 TABLET, FILM COATED ORAL 2 TIMES DAILY
Qty: 6 TABLET | Refills: 0 | Status: SHIPPED | OUTPATIENT
Start: 2019-09-29 | End: 2019-10-02

## 2019-09-27 NOTE — PROGRESS NOTES
Patient has positive UC for 2 bacterias, patient is asymptomatic for UTI.  Macrobid 100 mg BID and Cipro 500 mg BID x  3 days per  Trish prior to surgery sent to pharmacy.    Katelyn Huerta RN, BSN  Care Coordinator- Reconstructive Urology

## 2019-10-01 ENCOUNTER — ANESTHESIA EVENT (OUTPATIENT)
Dept: SURGERY | Facility: AMBULATORY SURGERY CENTER | Age: 34
End: 2019-10-01

## 2019-10-02 ENCOUNTER — ANESTHESIA (OUTPATIENT)
Dept: SURGERY | Facility: AMBULATORY SURGERY CENTER | Age: 34
End: 2019-10-02

## 2019-10-02 ENCOUNTER — HOSPITAL ENCOUNTER (OUTPATIENT)
Facility: AMBULATORY SURGERY CENTER | Age: 34
End: 2019-10-02
Attending: UROLOGY
Payer: COMMERCIAL

## 2019-10-02 VITALS
HEART RATE: 76 BPM | OXYGEN SATURATION: 98 % | RESPIRATION RATE: 16 BRPM | DIASTOLIC BLOOD PRESSURE: 71 MMHG | SYSTOLIC BLOOD PRESSURE: 125 MMHG | TEMPERATURE: 98.2 F

## 2019-10-02 DIAGNOSIS — S37.30XA URETHRAL INJURY, CLOSED, INITIAL ENCOUNTER: Primary | ICD-10-CM

## 2019-10-02 RX ORDER — MEPERIDINE HYDROCHLORIDE 25 MG/ML
12.5 INJECTION INTRAMUSCULAR; INTRAVENOUS; SUBCUTANEOUS
Status: DISCONTINUED | OUTPATIENT
Start: 2019-10-02 | End: 2019-10-03 | Stop reason: HOSPADM

## 2019-10-02 RX ORDER — OXYBUTYNIN CHLORIDE 5 MG/1
5 TABLET ORAL 2 TIMES DAILY PRN
Qty: 45 TABLET | Refills: 0 | Status: SHIPPED | OUTPATIENT
Start: 2019-10-02 | End: 2020-01-20

## 2019-10-02 RX ORDER — ONDANSETRON 4 MG/1
4 TABLET, ORALLY DISINTEGRATING ORAL EVERY 30 MIN PRN
Status: DISCONTINUED | OUTPATIENT
Start: 2019-10-02 | End: 2019-10-03 | Stop reason: HOSPADM

## 2019-10-02 RX ORDER — PROPOFOL 10 MG/ML
INJECTION, EMULSION INTRAVENOUS PRN
Status: DISCONTINUED | OUTPATIENT
Start: 2019-10-02 | End: 2019-10-02

## 2019-10-02 RX ORDER — FENTANYL CITRATE 50 UG/ML
25-50 INJECTION, SOLUTION INTRAMUSCULAR; INTRAVENOUS
Status: DISCONTINUED | OUTPATIENT
Start: 2019-10-02 | End: 2019-10-02 | Stop reason: HOSPADM

## 2019-10-02 RX ORDER — SODIUM CHLORIDE, SODIUM LACTATE, POTASSIUM CHLORIDE, CALCIUM CHLORIDE 600; 310; 30; 20 MG/100ML; MG/100ML; MG/100ML; MG/100ML
INJECTION, SOLUTION INTRAVENOUS CONTINUOUS
Status: DISCONTINUED | OUTPATIENT
Start: 2019-10-02 | End: 2019-10-02 | Stop reason: HOSPADM

## 2019-10-02 RX ORDER — FENTANYL CITRATE 50 UG/ML
INJECTION, SOLUTION INTRAMUSCULAR; INTRAVENOUS PRN
Status: DISCONTINUED | OUTPATIENT
Start: 2019-10-02 | End: 2019-10-02

## 2019-10-02 RX ORDER — BACITRACIN ZINC 500 [USP'U]/G
OINTMENT TOPICAL 2 TIMES DAILY
Qty: 30 G | Refills: 0 | Status: SHIPPED | OUTPATIENT
Start: 2019-10-02 | End: 2020-01-20

## 2019-10-02 RX ORDER — LIDOCAINE 40 MG/G
CREAM TOPICAL
Status: DISCONTINUED | OUTPATIENT
Start: 2019-10-02 | End: 2019-10-02 | Stop reason: HOSPADM

## 2019-10-02 RX ORDER — KETOROLAC TROMETHAMINE 30 MG/ML
INJECTION, SOLUTION INTRAMUSCULAR; INTRAVENOUS PRN
Status: DISCONTINUED | OUTPATIENT
Start: 2019-10-02 | End: 2019-10-02

## 2019-10-02 RX ORDER — ONDANSETRON 2 MG/ML
INJECTION INTRAMUSCULAR; INTRAVENOUS PRN
Status: DISCONTINUED | OUTPATIENT
Start: 2019-10-02 | End: 2019-10-02

## 2019-10-02 RX ORDER — GABAPENTIN 300 MG/1
300 CAPSULE ORAL ONCE
Status: COMPLETED | OUTPATIENT
Start: 2019-10-02 | End: 2019-10-02

## 2019-10-02 RX ORDER — ONDANSETRON 2 MG/ML
4 INJECTION INTRAMUSCULAR; INTRAVENOUS EVERY 30 MIN PRN
Status: DISCONTINUED | OUTPATIENT
Start: 2019-10-02 | End: 2019-10-03 | Stop reason: HOSPADM

## 2019-10-02 RX ORDER — DEXAMETHASONE SODIUM PHOSPHATE 4 MG/ML
INJECTION, SOLUTION INTRA-ARTICULAR; INTRALESIONAL; INTRAMUSCULAR; INTRAVENOUS; SOFT TISSUE PRN
Status: DISCONTINUED | OUTPATIENT
Start: 2019-10-02 | End: 2019-10-02

## 2019-10-02 RX ORDER — LIDOCAINE HYDROCHLORIDE 20 MG/ML
INJECTION, SOLUTION INFILTRATION; PERINEURAL PRN
Status: DISCONTINUED | OUTPATIENT
Start: 2019-10-02 | End: 2019-10-02

## 2019-10-02 RX ORDER — SENNA AND DOCUSATE SODIUM 50; 8.6 MG/1; MG/1
1 TABLET, FILM COATED ORAL AT BEDTIME
Qty: 30 TABLET | Refills: 1 | Status: SHIPPED | OUTPATIENT
Start: 2019-10-02 | End: 2020-01-20

## 2019-10-02 RX ORDER — OXYCODONE HYDROCHLORIDE 5 MG/1
5 TABLET ORAL ONCE
Status: COMPLETED | OUTPATIENT
Start: 2019-10-02 | End: 2019-10-02

## 2019-10-02 RX ORDER — NALOXONE HYDROCHLORIDE 0.4 MG/ML
.1-.4 INJECTION, SOLUTION INTRAMUSCULAR; INTRAVENOUS; SUBCUTANEOUS
Status: DISCONTINUED | OUTPATIENT
Start: 2019-10-02 | End: 2019-10-03 | Stop reason: HOSPADM

## 2019-10-02 RX ORDER — SODIUM CHLORIDE, SODIUM LACTATE, POTASSIUM CHLORIDE, CALCIUM CHLORIDE 600; 310; 30; 20 MG/100ML; MG/100ML; MG/100ML; MG/100ML
INJECTION, SOLUTION INTRAVENOUS CONTINUOUS
Status: DISCONTINUED | OUTPATIENT
Start: 2019-10-02 | End: 2019-10-03 | Stop reason: HOSPADM

## 2019-10-02 RX ORDER — ACETAMINOPHEN 325 MG/1
975 TABLET ORAL ONCE
Status: COMPLETED | OUTPATIENT
Start: 2019-10-02 | End: 2019-10-02

## 2019-10-02 RX ORDER — OXYCODONE HYDROCHLORIDE 5 MG/1
5 TABLET ORAL EVERY 6 HOURS PRN
Qty: 10 TABLET | Refills: 0 | Status: SHIPPED | OUTPATIENT
Start: 2019-10-02 | End: 2019-10-05

## 2019-10-02 RX ORDER — PROPOFOL 10 MG/ML
INJECTION, EMULSION INTRAVENOUS CONTINUOUS PRN
Status: DISCONTINUED | OUTPATIENT
Start: 2019-10-02 | End: 2019-10-02

## 2019-10-02 RX ADMIN — PROPOFOL 200 MG: 10 INJECTION, EMULSION INTRAVENOUS at 08:32

## 2019-10-02 RX ADMIN — Medication 0.5 MG: at 11:39

## 2019-10-02 RX ADMIN — PROPOFOL: 10 INJECTION, EMULSION INTRAVENOUS at 09:08

## 2019-10-02 RX ADMIN — OXYCODONE HYDROCHLORIDE 5 MG: 5 TABLET ORAL at 12:52

## 2019-10-02 RX ADMIN — PROPOFOL 75 MCG/KG/MIN: 10 INJECTION, EMULSION INTRAVENOUS at 11:48

## 2019-10-02 RX ADMIN — GABAPENTIN 300 MG: 300 CAPSULE ORAL at 06:44

## 2019-10-02 RX ADMIN — Medication 0.5 MG: at 10:11

## 2019-10-02 RX ADMIN — FENTANYL CITRATE 50 MCG: 50 INJECTION, SOLUTION INTRAMUSCULAR; INTRAVENOUS at 08:30

## 2019-10-02 RX ADMIN — DEXAMETHASONE SODIUM PHOSPHATE 4 MG: 4 INJECTION, SOLUTION INTRA-ARTICULAR; INTRALESIONAL; INTRAMUSCULAR; INTRAVENOUS; SOFT TISSUE at 08:37

## 2019-10-02 RX ADMIN — PROPOFOL 150 MCG/KG/MIN: 10 INJECTION, EMULSION INTRAVENOUS at 09:44

## 2019-10-02 RX ADMIN — PROPOFOL: 10 INJECTION, EMULSION INTRAVENOUS at 11:07

## 2019-10-02 RX ADMIN — LIDOCAINE HYDROCHLORIDE 80 MG: 20 INJECTION, SOLUTION INFILTRATION; PERINEURAL at 08:32

## 2019-10-02 RX ADMIN — FENTANYL CITRATE 25 MCG: 50 INJECTION, SOLUTION INTRAMUSCULAR; INTRAVENOUS at 08:51

## 2019-10-02 RX ADMIN — PROPOFOL 175 MCG/KG/MIN: 10 INJECTION, EMULSION INTRAVENOUS at 08:32

## 2019-10-02 RX ADMIN — SODIUM CHLORIDE, SODIUM LACTATE, POTASSIUM CHLORIDE, CALCIUM CHLORIDE: 600; 310; 30; 20 INJECTION, SOLUTION INTRAVENOUS at 06:56

## 2019-10-02 RX ADMIN — KETOROLAC TROMETHAMINE 30 MG: 30 INJECTION, SOLUTION INTRAMUSCULAR; INTRAVENOUS at 08:37

## 2019-10-02 RX ADMIN — ACETAMINOPHEN 975 MG: 325 TABLET ORAL at 06:44

## 2019-10-02 RX ADMIN — PROPOFOL: 10 INJECTION, EMULSION INTRAVENOUS at 10:31

## 2019-10-02 RX ADMIN — ONDANSETRON 4 MG: 2 INJECTION INTRAMUSCULAR; INTRAVENOUS at 08:37

## 2019-10-02 RX ADMIN — FENTANYL CITRATE 25 MCG: 50 INJECTION, SOLUTION INTRAMUSCULAR; INTRAVENOUS at 08:49

## 2019-10-02 ASSESSMENT — LIFESTYLE VARIABLES: TOBACCO_USE: 0

## 2019-10-02 NOTE — ANESTHESIA CARE TRANSFER NOTE
Patient: Ricky English    Procedure(s):  Posterior Urethroplasty (No Buccal Graft Planned)    Diagnosis: Urethral Stricture  Diagnosis Additional Information: No value filed.    Anesthesia Type:   General     Note:  Airway :LMA and Face Mask  Patient transferred to:PACU  Comments: CRNA removed LMA prior to leaving PACU. VSSHandoff Report: Identifed the Patient, Identified the Reponsible Provider, Reviewed the pertinent medical history, Discussed the surgical course, Reviewed Intra-OP anesthesia mangement and issues during anesthesia, Set expectations for post-procedure period and Allowed opportunity for questions and acknowledgement of understanding      Vitals: (Last set prior to Anesthesia Care Transfer)    CRNA VITALS  10/2/2019 1138 - 10/2/2019 1215      10/2/2019             Pulse:  76    SpO2:  99 %    Resp Rate (observed):  13                Electronically Signed By: CLARIBEL Henry CRNA  October 2, 2019  12:15 PM

## 2019-10-02 NOTE — ANESTHESIA POSTPROCEDURE EVALUATION
Anesthesia POST Procedure Evaluation    Patient: Ricky English   MRN:     [id number] Gender:   male   Age:    34 year old :      1985        Preoperative Diagnosis: Urethral Stricture   Procedure(s):  Posterior Urethroplasty (No Buccal Graft Planned)   Postop Comments: No value filed.       Anesthesia Type:  Not documented  General    Reportable Event: NO     PAIN: Uncomplicated   Sign Out status: Comfortable, Well controlled pain     PONV: No PONV   Sign Out status:  No Nausea or Vomiting     Neuro/Psych: Uneventful perioperative course   Sign Out Status: Preoperative baseline; Age appropriate mentation     Airway/Resp.: Uneventful perioperative course   Sign Out Status: Non labored breathing, age appropriate RR; Resp. Status within EXPECTED Parameters     CV: Uneventful perioperative course   Sign Out status: Appropriate BP and perfusion indices; Appropriate HR/Rhythm     Disposition:   Sign Out in:  PACU  Disposition:  Phase II; Home  Recovery Course: Uneventful  Follow-Up: Not required           Last Anesthesia Record Vitals:  CRNA VITALS  10/2/2019 1138 - 10/2/2019 1238      10/2/2019             Pulse:  76    SpO2:  99 %    Resp Rate (observed):  13          Last PACU Vitals:  Vitals Value Taken Time   /73 10/2/2019 12:35 PM   Temp 36.7  C (98.1  F) 10/2/2019 12:30 PM   Pulse 92 10/2/2019 12:34 PM   Resp 9 10/2/2019 12:35 PM   SpO2 93 % 10/2/2019 12:35 PM   Temp src     NIBP     Pulse     SpO2     Resp     Temp     Ht Rate     Temp 2     Vitals shown include unvalidated device data.      Electronically Signed By: Saud Anguiano DO, 2019, 2:13 PM

## 2019-10-02 NOTE — ANESTHESIA PREPROCEDURE EVALUATION
Anesthesia Pre-Procedure Evaluation    Patient: Ricky English   MRN:     [id number] Gender:   male   Age:    34 year old :      1985        Preoperative Diagnosis: Urethral Stricture   Procedure(s):  Posterior Urethroplasty (No Buccal Graft Planned)     No past medical history on file.   History reviewed. No pertinent surgical history.       Anesthesia Evaluation     . Pt has had prior anesthetic.     No history of anesthetic complications          ROS/MED HX    ENT/Pulmonary:  - neg pulmonary ROS    (-) tobacco use   Neurologic:  - neg neurologic ROS     Cardiovascular:  - neg cardiovascular ROS   (+) ----. : . . . :. . No previous cardiac testing       METS/Exercise Tolerance:  >4 METS   Hematologic:  - neg hematologic  ROS       Musculoskeletal:   (+)  other musculoskeletal- Psoriatic arthritis      GI/Hepatic:     (+) GERD Asymptomatic on medication,       Renal/Genitourinary: Comment: Pelvic straddle injury, urethral injury    (+) Other Renal/ Genitourinary, SP catheter      Endo:  - neg endo ROS       Psychiatric:  - neg psychiatric ROS       Infectious Disease:  - neg infectious disease ROS       Malignancy:      - no malignancy   Other:    - neg other ROS                     PHYSICAL EXAM:   Mental Status/Neuro: A/A/O   Airway: Facies: Feasible  Mallampati: I  Mouth/Opening: Full  TM distance: > 6 cm  Neck ROM: Full   Respiratory: Auscultation: CTAB     Resp. Rate: Normal     Resp. Effort: Normal      CV: Rhythm: Regular  Rate: Age appropriate  Heart: Normal Sounds  Edema: None   Comments:      Dental: Normal Dentition                LABS:  CBC: No results found for: WBC, HGB, HCT, PLT  BMP: No results found for: NA, POTASSIUM, CHLORIDE, CO2, BUN, CR, GLC  COAGS: No results found for: PTT, INR, FIBR  POC: No results found for: BGM, HCG, HCGS  OTHER: No results found for: PH, LACT, A1C, TRAVON, PHOS, MAG, ALBUMIN, PROTTOTAL, ALT, AST, GGT, ALKPHOS, BILITOTAL, BILIDIRECT, LIPASE, AMYLASE, PAU, TSH,  "T4, T3, CRP, SED     Preop Vitals    BP Readings from Last 3 Encounters:   08/19/19 119/83   08/05/19 (!) 143/89    Pulse Readings from Last 3 Encounters:   08/19/19 90   08/05/19 98      Resp Readings from Last 3 Encounters:   08/05/19 16    SpO2 Readings from Last 3 Encounters:   No data found for SpO2      Temp Readings from Last 1 Encounters:   No data found for Temp    Ht Readings from Last 1 Encounters:   08/19/19 1.753 m (5' 9\")      Wt Readings from Last 1 Encounters:   08/19/19 91.6 kg (202 lb)    Estimated body mass index is 29.83 kg/m  as calculated from the following:    Height as of 8/19/19: 1.753 m (5' 9\").    Weight as of 8/19/19: 91.6 kg (202 lb).     LDA:        Assessment:   ASA SCORE: 2    H&P: History and physical reviewed and following examination; no interval change.   Smoking Status:  Non-Smoker/Unknown   NPO Status: NPO Appropriate     Plan:   Anes. Type:  General   Pre-Medication: Acetaminophen; Gabapentin   Induction:  IV (Standard)   Airway: LMA   Access/Monitoring: PIV   Maintenance: Balanced     Postop Plan:   Postop Pain: Opioids  Postop Sedation/Airway: Not planned     PONV Management:   Adult Risk Factors:, Non-Smoker, Postop Opioids   Prevention: Ondansetron, Dexamethasone     CONSENT: Direct conversation   Plan and risks discussed with: Patient   Blood Products: Consent Deferred (Minimal Blood Loss)                   Saud Anguiano, DO  "

## 2019-10-02 NOTE — DISCHARGE INSTRUCTIONS
[facility]  Home Care Following Anesthesia  For 24 hours after surgery:  1. Get plenty of rest.  A responsible adult must stay with you for at least 24 hours after you leave the surgery center.  2. Do not drive or use heavy equipment.  If you have weakness or tingling, don't drive or use heavy equipment until this feeling goes away.   3. Do not drink alcohol.   4. Avoid strenuous or risky activities.  Ask for help when climbing stairs.  5. You may feel lightheaded.  IF so, sit for a few minutes before standing.  Have someone help you get up.   6. If you have nausea (feel sick to your stomach): Drink only clear liquids such as apple juice, ginger ale, broth or 7-Up.  Rest may also help.  Be sure to drink enough fluids.  Move to a regular diet as you feel able.   7. You may have a slight fever.  Call the doctor if your fever is over 100 F (37.7 C) (taken under the tongue) or lasts longer than 24 hours.  8. You may have a dry mouth, a sore throat, muscle aches or trouble sleeping. These should go away after 24 hours.  9. Do not make important or legal decisions.    Tips for taking pain medications  To get the best pain relief possible, remember these points:    Take pain medications as directed, before pain becomes severe.    Pain medication can upset your stomach: taking it with food may help.    Constipation is a common side effect of pain medication. Drink plenty of  fluids.    Eat foods high in fiber. Take a stool softener if recommended by your doctor or pharmacist.    Do not drink alcohol, drive or operate machinery while taking pain medications.    Ask about other ways to control pain, such as with heat, ice or relaxation.    Tylenol/Acetaminophen Consumption: 975 mg tylenol taken at 06:45AM, OK to take additional tylenol after 12:45PM. Follow package instructions.  To help encourage the safe use of acetaminophen, the makers of TYLENOL  have lowered the maximum daily dose  for single-ingredient Extra Strength TYLENOL  (acetaminophen) products sold in the [place] from 8 pills per day (4,000 mg) to 6 pills per day (3,000 mg). The dosing interval has also changed from 2 pills every 4-6 hours to 2 pills every 6 hours.    If you feel your pain relief is insufficient, you may take Tylenol/Acetaminophen in addition to your narcotic pain medication.     Be careful not to exceed 3,000 mg of Tylenol/Acetaminophen in a 24 hour period from all sources.    If you are taking extra strength Tylenol/acetaminophen (500 mg), the maximum dose is 6 tablets in 24 hours.    If you are taking regular strength acetaminophen (325 mg), the maximum dose is 9 tablets in 24 hours.    Call a doctor for any of the followin. Signs of infection (fever, growing tenderness at the surgery site, a large amount of drainage or bleeding, severe pain, foul-smelling drainage, redness, swelling).  2. It has been over 8 to 10 hours since surgery and you are still not able to urinate (pass water).  3. Headache for over 24 hours.  Your doctor is:  Dr. Glenn Alvarado, Prostate and Urology: [phone]  Or dial [phone] and ask for the resident on call for:  Prostate Urology  For emergency care, call the:  [facility]:  [phone] (TTY for hearing impaired: [phone])    Emptying and Cleaning Your Urinary Catheter Bag  You have an indwelling urinary catheter. This drains urine from your bladder into a bag. The bag can be one that is used at your bedside. Or it can be a smaller bag that is strapped to your leg. Follow the steps below to empty and clean a urinary bag.       Drain Clean tube Clean catheter   Step 1. Drain the bag    Wash your hands well with soap and water to prevent infecting the urinary catheter and bag.    If the short drainage tube is inserted into a pocket on the bag, take the drainage tube out of the pocket.    Hold the drainage tube over a toilet or measuring container. Open the  valve.    Don t touch the tip of the valve or let it touch the toilet or container.    Wash your hands again.  Step 2. Clean the drainage tube    When the bag is empty, clean the tip of the drainage valve with an alcohol wipe.    Close the valve.    Reinsert the drainage tube into the pocket, if there is one.  Step 3. Clean your skin    Wash your hands well before and after cleaning your skin.    If you have a catheter (such as a Toure) that enters through the urethra, clean the urethral area with soap and water 2 time(s) daily as you were taught by your healthcare provider. You should also clean after every bowel movement to prevent infection.  ? Don't pull on the tubing when cleaning so you don t injure the urethra.  ? Don t apply powder to the genital area or to the tubing.  Step 4. Check and clean the catheter tubing    Check the tubing. If there are kinks, cracks, clogs, or you can t see into the tubing, you ll need to change to new tubing as you were shown by your healthcare provider.    If the current tubing can still be used, wash it with soap and water. Always wash the tubing in the direction away from your body. Don't pull on the tubing.    Dry the tubing with a clean washcloth or paper towel.  Step 5. Clean the drainage bag    Have a clean backup bag or other drainage device ready.    Follow these steps:  ? Wash your hands well with soap and water.  ? Disconnect the bag from the catheter tubing. Connect the tubing to the backup bag or drainage device.  ? Drain any remaining urine from the bag you just disconnected. Close the drainage valve.  ? Pour some warm (not hot) soapy water into the bag. Swish the soap around, being sure to get the corners of the bag.  ? Open the drainage valve to drain the soap. Close the valve.:  ? Use a certain solution to clean the bag if your healthcare provider recommends one.     2 parts vinegar and 3 parts water  ? Ask your healthcare provider how often you should clean your  bag and what solution you should use to reduce odor and keep your bag free of germs.  ? Shake the solution a bit and allow it to remain in the bag for 30 minutes.  ? Drain the solution and rinse the bag with cold tap water.  ? Hang the bag to drain and air-dry.  When to call your healthcare provider  Call your healthcare provider right away if you have any of the following:    Little or no urine flowing into the bag    Urine leaking where the catheter enters the body    Pain, burning, or redness of the area where the catheter enters the body    Bloody urine (a trace of blood is normal)    Cloudy or foul-smelling urine, or sand-like grains in your urine    Pain in your lower back or lower abdomen    Your catheter falls out    Fever of 100.4 F (38 C) or higher, or as directed by your healthcare provider    Shaking chills   Date Last Reviewed: 1/1/2017 2000-2018 [employer]. [address] [place] [address], [place] [address]. All rights reserved. This information is not intended as a substitute for professional medical care. Always follow your healthcare professional's instructions.

## 2019-10-02 NOTE — OP NOTE
PREOPERATIVE DIAGNOSIS: Bulbar urethral stricture (1cm).     POSTOPERATIVE DIAGNOSIS: proximal and mid  Bulbar urethral stricture (3cm).     PROCEDURES:   1. Cystoscopy.  2. Complex single stage posterior urethral reconstruction using excision and primary anastomosis.    SURGEON: Glenn Schultz MD, MS  FELLOW: Nuha Chambers MD  ASSISTANT: Bryson Reynolds MD  ANESTHESIA: General  EBL: 100cc  COMPLICATIONS: none  FINDINGS: obliteration of urethra for approximately 3cm requiring extensive mobilization proximally and distally to penoscrotal junction.    INDICATIONS: Ricky English is a 34 year old gentleman who has a traumatic straddle injury to the bulbar urethral resulting in complete transection and SP tube placement at outside hospital. He developed a complete obliteration on imaging. The risks, benefits and alternatives of urethroplasty were discussed. He wished to proceed. He understands the risks to include but not be limited to bleeding, infection, penile pain or numbness, scrotal pain or numbness, disease recurrence, need for additional procedures and lower extremity neuropathy. He is aware of the risks of erectile dysfunction.     DESCRIPTION OF PROCEDURE: After informed consent was obtained and preoperative antibiotics were given, the patient was taken to the operating room and placed supine on the operating table. General anesthesia was induced and he was intubated. He was placed in the high lithotomy position with all pressure points well padded. The perineum was shaved, prepped and draped in the usual sterile fashion. A surgical time out was performed prior to incision.   A vertical midline incision was made in the perineum and carried down through Colle ' s fascia. There was a horizontal band of dense scar in the mid bulbar urethra involving the bulbospongiosus muscle. A 16 Fr catheter was inserted through a well-lubricated urethra up to the site of obstruction to aid in dissection. The muscle was  carefully split and reflected off the bulbar urethra. The urethra was circumferentially dissected off the underlying corporal bodies distal to the site of scar and carried proximally to the level of the scar. At this point we transected the urethra and moved the distal urethra out of the field with a sponge. The urethra at the level of the scar and proximal to the scar was then dissected until it was free from the corporal bodies. We could palpate the dense scar in the proximal urethra and transected this scar off the normal feeling proximal urethra. This revealed a lumen. Holding sutures of 4-0 Vicryl were placed at the 3 and 9 o clock positions in the proximal urethra. We tried to pass a 20 Fr Bougie toward the bladder but this would not pass.   We then decided to perform flexible cystoscopy through the suprapubic tube tract which passed easily through the prostatic urethra, pass the membranous urethra for 1-2cm before exiting the site of transection. This confirmed that we were nearly at the proximal extent of the stricture. We cut away that last bit of narrowed urethra off the proximal urethra and replaced the 4-0 vicryl stay sutures. We were able to pass 28Fr Bougie after this. We mobilized the proximal urethral off the pelvic floor to allow tension free anastomosis later.   We then turned out attention to the distal urethral segment. We placed 4-0 vicryl holding sutures at the 3 and 9 o clock positions and used the 16 Fr urethral catheter to determine the length of obliterated urethra and then transected the strictured segment. This was spatulated dorsally. We then mobilized the urethra up to the penoscrotal junction to make sure we had enough length for a tension free anastomosis. Bleeding points were controlled with bipolar cautery.   The total length of stricture removed was 3cm.   Once mobilized, we turned to performing our anastomosis. We spatulated the ventral aspect of the proximal urethra. We  pre-placed 12 separate 5-0 PDS sutures in the proximal urethra with full thickness bites through the mucosa and tunica of the spongiosum. We then passed the sutures through the distal urethra and parachuted the distal urethra on the proximal urethral opening and tied the sutures down. This was all done over a 16 Fr urethral catheter. Hemostasis was further achieved.      The bulbospongiosus muscles were then closed with a running 3-0 Vicryl. Colle's fascia was closed with running 3-0 Vicryl. The skin was closed with interrupted 4-0 Vicryl. The patient was awakened from anesthesia, transferred to [facility] and then to the postanesthesia care unit in stable condition.    Luz Elena Chambers MD  Reconstructive Urology Fellow      Data Unavailable  0 lbs 0 oz  There is no height or weight on file to calculate BMI.

## 2019-10-03 ENCOUNTER — PATIENT OUTREACH (OUTPATIENT)
Dept: UROLOGY | Facility: CLINIC | Age: 34
End: 2019-10-03

## 2019-10-03 ENCOUNTER — HEALTH MAINTENANCE LETTER (OUTPATIENT)
Age: 34
End: 2019-10-03

## 2019-10-04 LAB — COPATH REPORT: NORMAL

## 2019-10-07 ENCOUNTER — PRE VISIT (OUTPATIENT)
Dept: UROLOGY | Facility: CLINIC | Age: 34
End: 2019-10-07

## 2019-10-07 PROBLEM — K21.9 GASTROESOPHAGEAL REFLUX DISEASE: Status: ACTIVE | Noted: 2019-10-07

## 2019-10-07 NOTE — TELEPHONE ENCOUNTER
Reason for Visit: Post-op follow up, procedure 10/2    Diagnosis: Proximal and mid Bulbar urethral stricture (3cm)    Orders/Procedures/Records: Records available    Contact Patient: N/A    Rooming Requirements: Normal      Ibeth Ferreira  10/07/19  6:00 PM

## 2019-10-09 ENCOUNTER — TELEPHONE (OUTPATIENT)
Dept: UROLOGY | Facility: CLINIC | Age: 34
End: 2019-10-09

## 2019-10-09 NOTE — TELEPHONE ENCOUNTER
Robert Zepeda,    Patient states that he thinks he is having an allergic reaction from his surgery. He states that his fingers are swollen and with bumps. Patient states that he did not call his PCP but he called his Rheumatologist. He has a bump on his scrotum. That he states was swollen but has subsided half the size from yesterday.He will send a my chart picture.         Lashawn Marin MA

## 2019-10-09 NOTE — TELEPHONE ENCOUNTER
Spoke with patient to reschedule an appointment. Patient then states he would like to talk to a nurse in regards to surgery he recently had and requested a call back. Patient can be reached at [phone].  Thank you,  Serina Lima October 9, 2019 2:46 PM

## 2019-10-10 NOTE — TELEPHONE ENCOUNTER
Please see mychart messages between myself and patient.    Katelyn Huerta, RN, BSN  Care Coordinator- Reconstructive Urology

## 2019-10-21 ENCOUNTER — OFFICE VISIT (OUTPATIENT)
Dept: UROLOGY | Facility: CLINIC | Age: 34
End: 2019-10-21
Payer: COMMERCIAL

## 2019-10-21 ENCOUNTER — ANCILLARY PROCEDURE (OUTPATIENT)
Dept: GENERAL RADIOLOGY | Facility: CLINIC | Age: 34
End: 2019-10-21
Attending: UROLOGY
Payer: COMMERCIAL

## 2019-10-21 VITALS
SYSTOLIC BLOOD PRESSURE: 124 MMHG | HEART RATE: 65 BPM | BODY MASS INDEX: 25.92 KG/M2 | HEIGHT: 69 IN | DIASTOLIC BLOOD PRESSURE: 77 MMHG | WEIGHT: 175 LBS

## 2019-10-21 DIAGNOSIS — N35.011 POST-TRAUMATIC BULBOUS URETHRAL STRICTURE: Primary | ICD-10-CM

## 2019-10-21 DIAGNOSIS — Q64.32 CONGENITAL STRICTURE OF URETHRA: ICD-10-CM

## 2019-10-21 ASSESSMENT — MIFFLIN-ST. JEOR: SCORE: 1714.17

## 2019-10-21 ASSESSMENT — PAIN SCALES - GENERAL: PAINLEVEL: NO PAIN (0)

## 2019-10-21 NOTE — LETTER
"10/21/2019       RE: Ricky English  [address]  [facility] [address]     Dear Colleague,    Thank you for referring your patient, Ricky English, to the [facility] UROLOGY AND INST FOR PROSTATE AND UROLOGIC CANCERS at [facility]. Please see a copy of my visit note below.    HPI :  Ricky English is a 34 year old male who is 2.5 weeks s/p 3cm EPA for bulbar urethral obliteration 2/2 straddle injury.    History:   Had traumatic straddle injury to the bulbar urethral resulting in complete transection  SP tube placed at outside hospital. Ultimately developed complete obliteration on imaging.    Pain is nearly gone. Not taking anything for pain.   Minimal swelling of scrotum after surgery. No erections yet.     VCUG today: no extravasation, no stricture    EXAM:  /77   Pulse 65   Ht 1.753 m (5' 9\")   Wt 79.4 kg (175 lb)   BMI 25.84 kg/m     Incision clean, dry, intact  No tenderness or evidence of cellulitis  No hematoma  Sutures are intact      A/P   Excellent outcome after EPA for 3cm bulbar obliteration 2/2 straddle injury    F/U: RTC in 3 months for cystoscopy/PROM/Flow      Again, thank you for allowing me to participate in the care of your patient.      Sincerely,    Nuha Chambers MD      "

## 2019-10-21 NOTE — PATIENT INSTRUCTIONS
Please schedule an appointment for a cystoscopy with Dr. Chambers in 3 months. We will do a Flow test and bladder scan at the appointment, so please arrive with a comfortably full bladder.      It was a pleasure meeting with you today.  Thank you for allowing me and my team the privilege of caring for you today.  YOU are the reason we are here, and I truly hope we provided you with the excellent service you deserve.  Please let us know if there is anything else we can do for you so that we can be sure you are leaving completely satisfied with your care experience.

## 2019-10-21 NOTE — NURSING NOTE
"Post Op-2 week    He states things well since surgery and denies any signs of infections.        Chief Complaint   Patient presents with     Surgical Followup     2 week EPA Bulbar urethral obliteration        Blood pressure 124/77, pulse 65, height 1.753 m (5' 9\"), weight 79.4 kg (175 lb). Body mass index is 25.84 kg/m .    Patient Active Problem List   Diagnosis     Heartburn     Hx of colonic polyp     Pelvic straddle injury     Perineal hematoma     Psoriatic arthritis (H)     Urethral injury, closed, initial encounter     Gastroesophageal reflux disease       Allergies   Allergen Reactions     Ceclor [Cefaclor]      Tegretol [Carbamazepine]        Current Outpatient Medications   Medication Sig Dispense Refill     bacitracin 500 UNIT/GM external ointment Apply topically 2 times daily To tip of penis 30 g 0     cholecalciferol (VITAMIN D3) 400 unit (10 mcg) TABS tablet        COSENTYX SENSOREADY 300 DOSE 150 MG/ML Sensoready pen   10     fish oil-omega-3 fatty acids 1000 MG capsule        glucosamine-chondroitin (GLUCOSAMINE CHONDR COMPLEX) 500-400 MG CAPS per capsule        Multiple Vitamin (DAILY VITAMINS) TABS        omeprazole (PRILOSEC) 40 MG DR capsule TK 1 C PO QD IN THE MORNING OES  3     oxybutynin (DITROPAN) 5 MG tablet Take 1 tablet (5 mg) by mouth 2 times daily as needed for bladder spasms 45 tablet 0     SENNA-docusate sodium (SENNA S) 8.6-50 MG tablet Take 1 tablet by mouth At Bedtime 30 tablet 1     tolterodine (DETROL) 1 MG tablet Take 1 tablet (1 mg) by mouth 2 times daily as needed for bladder spasms 120 tablet 0       Social History     Tobacco Use     Smoking status: Never Smoker     Smokeless tobacco: Never Used   Substance Use Topics     Alcohol use: Yes     Comment: A couple beers per week     Drug use: Yes     Types: Marijuana     Comment: Once a month       Yeyo Romeo, EMT, EMT  10/21/2019  10:19 AM      "

## 2019-10-22 ENCOUNTER — TELEPHONE (OUTPATIENT)
Dept: UROLOGY | Facility: CLINIC | Age: 34
End: 2019-10-22

## 2019-10-22 NOTE — TELEPHONE ENCOUNTER
[facility] and emailed back to patient per his request.      Katelyn Huerta RN, BSN  Care Coordinator- Reconstructive Urology

## 2019-10-22 NOTE — TELEPHONE ENCOUNTER
[facility] Call Center    Phone Message    May a detailed message be left on voicemail: yes    Reason for Call: Form or Letter   Type or form/letter needing completion: return to work form. Pt stated he did not realize this needed to be filled out and turned to his employer today. The pt sent a [employer] message as well with the form that needs to be filled out. Pt stated it is urgent   Provider: Dr. Chambers  Date form needed: 10/22/19  Once completed: Fax form to: email back to pt through [employer]      Action Taken: Message routed to:  [facility] uro

## 2019-12-02 DIAGNOSIS — N32.89 BLADDER SPASMS: ICD-10-CM

## 2019-12-02 RX ORDER — TOLTERODINE TARTRATE 1 MG/1
1 TABLET, EXTENDED RELEASE ORAL 2 TIMES DAILY PRN
Qty: 120 TABLET | Refills: 0 | Status: SHIPPED | OUTPATIENT
Start: 2019-12-02 | End: 2020-01-20

## 2019-12-02 NOTE — TELEPHONE ENCOUNTER
Last Clinic Visit: 10/21/2019  [facility] Urology and [facility] Urologic C[facility]

## 2019-12-30 ENCOUNTER — PRE VISIT (OUTPATIENT)
Dept: UROLOGY | Facility: CLINIC | Age: 34
End: 2019-12-30

## 2019-12-30 NOTE — TELEPHONE ENCOUNTER
Reason for Visit: Cystoscopy    Diagnosis: Post-traumatic bulbous urethral stricture    Orders/Procedures/Records: Records available    Contact Patient: N/A    Rooming Requirements: Cystoscopy, Flow/PVR      Ibeth Ferreira  12/30/19  11:03 AM

## 2020-01-19 NOTE — PROGRESS NOTES
Urethroplasty Follow-up Visit with Surveillance Urethroscopy    PRE-PROCEDURE DIAGNOSIS: History of urethral stricture    POST-PROCEDURE DIAGNOSIS: No evidence of urethral stricture     PROCEDURE: Urethroscopy    HISTORY: Ricky English is a 34 year old male who is 3-4 months s/p 3cm EPA for bulbar urethral obliteration 2/2 straddle injury.     History:   Had traumatic straddle injury to the bulbar urethral resulting in complete transection.  SP tube placed at outside hospital. Ultimately developed complete obliteration on imaging s/p repair as above. VCUG 2.5 weeks post op unremarkable.    Still having soreness but this is rare and minimal. Erections improving but still weaker than preop.  Very happy with his strong urinary stream.    Questionnaires reviewed. See flowsheet for details.    REVIEW OF OFFICE STUDIES:      Urinary Flow Rate  Peak Flow: 6 mL/s  Average Flow: 2.6 mL/s  Voided (mL): 61 mL  Residual Volume by Ultrasound: 0 mL  Character of Curve: Can not be characterized    DESCRIPTION OF PROCEDURE:  After informed consent was obtained, the patient was brought to the procedure room where he was placed in the supine position with all pressure points well padded.  He was prepped and draped in a sterile fashion. A flexible cystoscope was introduced through a well-lubricated urethra.  The anterior urethra up to the point of reconstruction was normal in appearance. At the site of reconstruction there was not evidence of stricture recurrence. The narrowest caliber of the urethra at the reconstruction was estimated to be 24F and this was located in proximal bulbar urethra. The flexible cystoscope passed easily. The voluntary sphincter was identified and the scope withdrawn.    ASSESSMENT AND PLAN:  Excellent outcome after urethroplasty. The patient will follow-up in 9 months with with uroflowmetry, post-void residual urine volume measurement, Urethroplasty PROM, MORA, MSHQ, and CLSS and post-op questionnaires.  Cystoscopy will be needed. If symptoms recur cystoscopy will be done sooner.    -- Will give sildenafil to trial for his erections. Instructed on appropriate use and side effects  -- Enrolled in the remote follow up study

## 2020-01-20 ENCOUNTER — OFFICE VISIT (OUTPATIENT)
Dept: UROLOGY | Facility: CLINIC | Age: 35
End: 2020-01-20
Payer: COMMERCIAL

## 2020-01-20 VITALS
SYSTOLIC BLOOD PRESSURE: 124 MMHG | DIASTOLIC BLOOD PRESSURE: 77 MMHG | HEIGHT: 69 IN | WEIGHT: 175 LBS | BODY MASS INDEX: 25.92 KG/M2 | HEART RATE: 65 BPM

## 2020-01-20 DIAGNOSIS — N52.33 ERECTILE DYSFUNCTION FOLLOWING URETHRAL SURGERY: ICD-10-CM

## 2020-01-20 DIAGNOSIS — N35.011 POST-TRAUMATIC BULBOUS URETHRAL STRICTURE: Primary | ICD-10-CM

## 2020-01-20 RX ORDER — SILDENAFIL 50 MG/1
50 TABLET, FILM COATED ORAL DAILY PRN
Qty: 30 TABLET | Refills: 3 | Status: SHIPPED | OUTPATIENT
Start: 2020-01-20

## 2020-01-20 ASSESSMENT — MIFFLIN-ST. JEOR: SCORE: 1714.17

## 2020-01-20 ASSESSMENT — PAIN SCALES - GENERAL: PAINLEVEL: NO PAIN (0)

## 2020-01-20 NOTE — NURSING NOTE
Chief Complaint   Patient presents with     Cystoscopy     Post-traumatic bulbous urethral stricture         Patient Active Problem List   Diagnosis     Heartburn     Hx of colonic polyp     Pelvic straddle injury     Perineal hematoma     Psoriatic arthritis (H)     Urethral injury, closed, initial encounter     Gastroesophageal reflux disease       Allergies   Allergen Reactions     Ceclor [Cefaclor]      Tegretol [Carbamazepine]        Current Outpatient Medications   Medication Sig Dispense Refill     bacitracin 500 UNIT/GM external ointment Apply topically 2 times daily To tip of penis 30 g 0     cholecalciferol (VITAMIN D3) 400 unit (10 mcg) TABS tablet        COSENTYX SENSOREADY 300 DOSE 150 MG/ML Sensoready pen   10     fish oil-omega-3 fatty acids 1000 MG capsule        glucosamine-chondroitin (GLUCOSAMINE CHONDR COMPLEX) 500-400 MG CAPS per capsule        Multiple Vitamin (DAILY VITAMINS) TABS        omeprazole (PRILOSEC) 40 MG DR capsule TK 1 C PO QD IN THE MORNING OES  3     oxybutynin (DITROPAN) 5 MG tablet Take 1 tablet (5 mg) by mouth 2 times daily as needed for bladder spasms 45 tablet 0     SENNA-docusate sodium (SENNA S) 8.6-50 MG tablet Take 1 tablet by mouth At Bedtime 30 tablet 1     tolterodine (DETROL) 1 MG tablet Take 1 tablet (1 mg) by mouth 2 times daily as needed for bladder spasms 120 tablet 0       Social History     Tobacco Use     Smoking status: Never Smoker     Smokeless tobacco: Never Used   Substance Use Topics     Alcohol use: Yes     Comment: A couple beers per week     Drug use: Yes     Types: Marijuana     Comment: Once a month       Invasive Procedure Safety Checklist:    Procedure: Cystoscopy    Action: Complete sections and checkboxes as appropriate.    Pre-procedure:  1. Patient ID Verified with 2 identifiers (Shea and  or MRN) : YES    2. Procedure and site verified with patient/designee (when able) : YES    3. Accurate consent documentation in medical record :  YES    4. H&P (or appropriate assessment) documented in medical record : N/A  H&P must be up to 30 days prior to procedure an updated within 24 hours of                 Procedure as applicable.     5. Relevant diagnostic and radiology test results appropriately labeled and displayed as applicable : YES    6. Blood products, implants, devices, and/or special equipment available for the procedure as applicable : YES    7. Procedure site(s) marked with provider initials [Exclusions: none] : NO    8. Marking not required. Reason : Yes  Procedure does not require site marking    Time Out:     Time-Out performed immediately prior to starting procedure, including verbal and active participation of all team members addressing: YES    1. Correct patient identity.  2. Confirmed that the correct side and site are marked.  3. An accurate procedure to be done.  4. Agreement on the procedure to be done.  5. Correct patient position.  6. Relevant images and results are properly labeled and appropriately displayed.  7. The need to administer antibiotics or fluids for irrigation purposes during the procedure as applicable.  8. Safety precautions based on patient history or medication use.    During Procedure: Verification of correct person, site, and procedure occurs any time the responsibility for care of the patient is transferred to another member of the care team.    The following medication was given:     MEDICATION:  Lidocaine without epinephrine 2% jelly  ROUTE: Urethral  SITE: Urethra via the meatus  DOSE: 10 mL  LOT #: [id number]  : [employer]  EXPIRATION DATE: 9-2021  NDC#: [address]-86   Was there drug waste? No    Prior to med admin, verified patient identity using patient's name and date of birth.  Due to med administration, patient instructed to remain in clinic for 15 minutes  afterwards, and to report any adverse reaction to me immediately.    Drug Amount Wasted:   None.  Vial/Syringe: Syringe      Ibeth Ferreira  1/20/2020  11:36 AM

## 2020-01-20 NOTE — PATIENT INSTRUCTIONS
"Please schedule a cystoscopy with Dr. Schultz or the fellow in 9 months. Please arrive with a comfortably full bladder.        It was a pleasure meeting with you today.  Thank you for allowing me and my team the privilege of caring for you today.  YOU are the reason we are here, and I truly hope we provided you with the excellent service you deserve.  Please let us know if there is anything else we can do for you so that we can be sure you are leaving completely satisfied with your care experience.          AFTER YOUR CYSTOSCOPY        You have just completed a cystoscopy, or \"cysto\", which allowed your physician to learn more about your bladder (or to remove a stent placed after surgery). We suggest that you continue to avoid caffeine, fruit juice, and alcohol for the next 24 hours, however, you are encouraged to return to your normal activities.         A few things that are considered normal after your cystoscopy:     * Small amount of bleeding (or spotting) that clears within the next 24 hours     * Slight burning sensation with urination     * Sensation to of needing to avoid more frequently     * The feeling of \"air\" in your urine     * Mild discomfort that is relieved with Tylenol        Please contact our office promptly if you:     * Develop a fever above 101 degrees     * Are unable to urinate     * Develop bright red blood that does not stop     * Severe pain or swelling         Please contact our office with any concerns or questions @[email or web address].  "

## 2020-08-26 NOTE — LETTER
----- Message from Eli Cho sent at 8/26/2020 11:55 AM CDT -----  Regarding: Message from [facility]  Lizbet's insurance does not cover Zovirax cream. They did state covered alternatives, acyclovir tabs/caps, famciclovir, and valacyclovir. Thank you!    Eli Cho  Certified Pharmacy Technician  [facility] [address] [place] [address] [phone]        1/20/2020       RE: Ricky English  [address]  [facility] [address]     Dear Colleague,    Thank you for referring your patient, Ricky English, to the [facility] UROLOGY AND INST FOR PROSTATE AND UROLOGIC CANCERS at [facility]. Please see a copy of my visit note below.    Urethroplasty Follow-up Visit with Surveillance Urethroscopy    PRE-PROCEDURE DIAGNOSIS: History of urethral stricture    POST-PROCEDURE DIAGNOSIS: No evidence of urethral stricture     PROCEDURE: Urethroscopy    HISTORY: Ricky English is a 34 year old male who is  3-4 months s/p 3cm EPA for bulbar urethral obliteration 2/2 straddle injury.     History:   Had traumatic straddle injury to the bulbar urethral resulting in complete transection.  SP tube placed at outside hospital. Ultimately developed complete obliteration on imaging s/p repair as above. VCUG 2.5 weeks post op unremarkable.    Still having soreness but this is rare and minimal. Erections improving but still weaker than preop.  Very happy with his strong urinary stream.    Questionnaires reviewed. See flowsheet for details.    REVIEW OF OFFICE STUDIES:      Urinary Flow Rate  Peak Flow: 6 mL/s  Average Flow: 2.6 mL/s  Voided (mL): 61 mL  Residual Volume by Ultrasound: 0 mL  Character of Curve: Can not be characterized    DESCRIPTION OF PROCEDURE:  After informed consent was obtained, the patient was brought to the procedure room where he was placed in the supine position with all pressure points well padded.  He was prepped and draped in a sterile fashion. A flexible cystoscope was introduced through a well-lubricated urethra.  The anterior urethra up to the point of reconstruction was normal in appearance. At the site of reconstruction there was not evidence of stricture recurrence. The narrowest caliber of the urethra at the reconstruction was estimated to be 24F and this was located in proximal bulbar urethra. The flexible  cystoscope passed easily. The voluntary sphincter was identified and the scope withdrawn.    ASSESSMENT AND PLAN:  Excellent outcome after urethroplasty. The patient will follow-up in 9 months with with uroflowmetry, post-void residual urine volume measurement, Urethroplasty PROM, MORA, MSHQ, and CLSS and post-op questionnaires. Cystoscopy will be needed. If symptoms recur cystoscopy will be done sooner.    -- Will give sildenafil to trial for his erections. Instructed on appropriate use and side effects  -- Enrolled in the remote follow up study    Again, thank you for allowing me to participate in the care of your patient.      Sincerely,    Nuha Chambers MD

## 2020-09-17 ENCOUNTER — TELEPHONE (OUTPATIENT)
Dept: UROLOGY | Facility: CLINIC | Age: 35
End: 2020-09-17

## 2020-09-17 NOTE — TELEPHONE ENCOUNTER
Left a detailed VM for patient stating his appointment time moved from 10:30-1:30 with Dr Schultz's fellow on the same day. Will mail this out

## 2020-10-12 ENCOUNTER — PRE VISIT (OUTPATIENT)
Dept: UROLOGY | Facility: CLINIC | Age: 35
End: 2020-10-12

## 2020-10-12 NOTE — TELEPHONE ENCOUNTER
Reason for Visit: Cystoscopy     Diagnosis: Post-traumatic bulbous urethral stricture     Orders/Procedures/Records: Records available, questionnaires assigned.      Contact Patient: yes     Rooming Requirements: Flow/PVR

## 2020-10-19 ENCOUNTER — OFFICE VISIT (OUTPATIENT)
Dept: UROLOGY | Facility: CLINIC | Age: 35
End: 2020-10-19
Payer: COMMERCIAL

## 2020-10-19 VITALS — DIASTOLIC BLOOD PRESSURE: 94 MMHG | HEART RATE: 82 BPM | SYSTOLIC BLOOD PRESSURE: 136 MMHG

## 2020-10-19 DIAGNOSIS — N35.011 POST-TRAUMATIC BULBOUS URETHRAL STRICTURE: ICD-10-CM

## 2020-10-19 DIAGNOSIS — N52.8 OTHER MALE ERECTILE DYSFUNCTION: Primary | ICD-10-CM

## 2020-10-19 DIAGNOSIS — Z87.448 HISTORY OF URETHRAL STRICTURE: ICD-10-CM

## 2020-10-19 PROCEDURE — 51798 US URINE CAPACITY MEASURE: CPT | Performed by: UROLOGY

## 2020-10-19 PROCEDURE — 52000 CYSTOURETHROSCOPY: CPT | Performed by: UROLOGY

## 2020-10-19 PROCEDURE — 51741 ELECTRO-UROFLOWMETRY FIRST: CPT | Performed by: UROLOGY

## 2020-10-19 RX ORDER — SILDENAFIL CITRATE 20 MG/1
20 TABLET ORAL PRN
Qty: 10 TABLET | Refills: 3 | Status: SHIPPED | OUTPATIENT
Start: 2020-10-19 | End: 2020-10-29

## 2020-10-19 RX ORDER — PENICILLIN V POTASSIUM 500 MG/1
TABLET, FILM COATED ORAL
COMMUNITY
Start: 2020-10-16

## 2020-10-19 ASSESSMENT — PAIN SCALES - GENERAL: PAINLEVEL: NO PAIN (0)

## 2020-10-19 NOTE — NURSING NOTE
Chief Complaint   Patient presents with     Cystoscopy       Blood pressure (!) 136/94, pulse 82. There is no height or weight on file to calculate BMI.    Patient Active Problem List   Diagnosis     Heartburn     Hx of colonic polyp     Pelvic straddle injury     Perineal hematoma     Psoriatic arthritis (H)     Urethral injury, closed, initial encounter     Gastroesophageal reflux disease       Allergies   Allergen Reactions     Ceclor [Cefaclor]      Tegretol [Carbamazepine]        Current Outpatient Medications   Medication Sig Dispense Refill     cholecalciferol (VITAMIN D3) 400 unit (10 mcg) TABS tablet        COSENTYX SENSOREADY 300 DOSE 150 MG/ML Sensoready pen   10     fish oil-omega-3 fatty acids 1000 MG capsule        glucosamine-chondroitin (GLUCOSAMINE CHONDR COMPLEX) 500-400 MG CAPS per capsule        Multiple Vitamin (DAILY VITAMINS) TABS        omeprazole (PRILOSEC) 40 MG DR capsule TK 1 C PO QD IN THE MORNING OES  3     sildenafil (VIAGRA) 50 MG tablet Take 1 tablet (50 mg) by mouth daily as needed (erectile dysfunction) 30 tablet 3     penicillin V (VEETID) 500 MG tablet TK 1 T PO  QID         Social History     Tobacco Use     Smoking status: Never Smoker     Smokeless tobacco: Never Used   Substance Use Topics     Alcohol use: Yes     Comment: A couple beers per week     Drug use: Yes     Types: Marijuana     Comment: Once a month       Alondra Sanford CMA  10/19/2020  12:35 PM     Invasive Procedure Safety Checklist:    Procedure: Cystoscopy    Action: Complete sections and checkboxes as appropriate.    Pre-procedure:  1. Patient ID Verified with 2 identifiers (Shea and  or MRN) : YES    2. Procedure and site verified with patient/designee (when able) : YES    3. Accurate consent documentation in medical record : YES    4. H&P (or appropriate assessment) documented in medical record : YES  H&P must be up to 30 days prior to procedure an updated within 24 hours of                 Procedure  as applicable.     5. Relevant diagnostic and radiology test results appropriately labeled and displayed as applicable : YES    6. Blood products, implants, devices, and/or special equipment available for the procedure as applicable : YES    7. Procedure site(s) marked with provider initials [Exclusions: None] : NO    8. Marking not required. Reason : Yes  Procedure does not require site marking    Time Out:     Time-Out performed immediately prior to starting procedure, including verbal and active participation of all team members addressing: YES    1. Correct patient identity.  2. Confirmed that the correct side and site are marked.  3. An accurate procedure to be done.  4. Agreement on the procedure to be done.  5. Correct patient position.  6. Relevant images and results are properly labeled and appropriately displayed.  7. The need to administer antibiotics or fluids for irrigation purposes during the procedure as applicable.  8. Safety precautions based on patient history or medication use.    During Procedure: Verification of correct person, site, and procedure occurs any time the responsibility for care of the patient is transferred to another member of the care team.    No medications administered during this procedure.    Alondra Sanford CMA  October 19, 2020

## 2020-10-19 NOTE — PROGRESS NOTES
Urethroplasty Follow-up Visit with Surveillance Urethroscopy    PRE-PROCEDURE DIAGNOSIS: History of urethral stricture  POST-PROCEDURE DIAGNOSIS: No evidence of urethral stricture   PROCEDURE: Urethroscopy    HISTORY: Ricky English is a 35 year old man 12 months status-post anastomotic urethroplasty for urethral stricture.     BMG complaints: No  Positioning complaints: No  Perineal / Genital complaints: Yes; pain is minimal; numbness is severe in posterior scrotum   Urinary incontinence: No    ED: minimal response to 20mg revatio/sildenafil.   Questionnaires reviewed. See flowsheet for details.    REVIEW OF OFFICE STUDIES:  Urinary Flow Rate  Peak Flow: 27.3 mL/s  Average Flow: 12.5 mL/s  Voided (mL): 241 mL  Character of Curve: Bell Shape     DESCRIPTION OF PROCEDURE:  After informed consent was obtained, the patient was brought to the procedure room where he was placed in the supine position with all pressure points well padded.  He was prepped and draped in a sterile fashion. A flexible cystoscope was introduced through a well-lubricated urethra.  The anterior urethra up to the point of reconstruction was normal in appearance. At the site of reconstruction there was not evidence of stricture recurrence. The narrowest caliber of the urethra at the reconstruction was estimated to be 28F and this was located in proximal to mid bulbar urethra. The flexible cystoscope passed easily. The voluntary sphincter was identified and the scope withdrawn.    ASSESSMENT AND PLAN:  Excellent outcome after urethroplasty.   For the ED patient will try higher doses of sildenafil and follow-up in a month with a video visit.  If he does not have a good response then he is open to either vacuum erection device or intracavernosal injection therapy.  We also briefly discussed the option of arterial bypass graft if these do not work.    For the stricture, the patient will follow-up prn

## 2020-10-19 NOTE — PATIENT INSTRUCTIONS
"Take medication as directed.    Schedule a follow up video visit to discuss results.    It was a pleasure meeting with you today.  Thank you for allowing me and my team the privilege of caring for you today.  YOU are the reason we are here, and I truly hope we provided you with the excellent service you deserve.  Please let us know if there is anything else we can do for you so that we can be sure you are leaving completely satisfied with your care experience.        Alondra Sanford CMA    AFTER YOUR CYSTOSCOPY        You have just completed a cystoscopy, or \"cysto\", which allowed your physician to learn more about your bladder (or to remove a stent placed after surgery). We suggest that you continue to avoid caffeine, fruit juice, and alcohol for the next 24 hours, however, you are encouraged to return to your normal activities.         A few things that are considered normal after your cystoscopy:     * Small amount of bleeding (or spotting) that clears within the next 24 hours     * Slight burning sensation with urination     * Sensation to of needing to avoid more frequently     * The feeling of \"air\" in your urine     * Mild discomfort that is relieved with Tylenol        Please contact our office promptly if you:     * Develop a fever above 101 degrees     * Are unable to urinate     * Develop bright red blood that does not stop     * Severe pain or swelling         Please contact our office with any concerns or questions [email or web address].  "

## 2020-10-19 NOTE — LETTER
10/19/2020       RE: Ricky English  [address]  [facility] [address]     Dear Colleague,    Thank you for referring your patient, Ricky English, to the [facility] UROLOGY CLINIC [place] at [facility]. Please see a copy of my visit note below.    Urethroplasty Follow-up Visit with Surveillance Urethroscopy    PRE-PROCEDURE DIAGNOSIS: History of urethral stricture  POST-PROCEDURE DIAGNOSIS: No evidence of urethral stricture   PROCEDURE: Urethroscopy    HISTORY: Ricky English is a 35 year old man 12 months status-post anastomotic urethroplasty for urethral stricture.     BMG complaints: No  Positioning complaints: No  Perineal / Genital complaints: Yes; pain is minimal; numbness is severe in posterior scrotum   Urinary incontinence: No    ED: minimal response to 20mg revatio/sildenafil.   Questionnaires reviewed. See flowsheet for details.    REVIEW OF OFFICE STUDIES:  Urinary Flow Rate  Peak Flow: 27.3 mL/s  Average Flow: 12.5 mL/s  Voided (mL): 241 mL  Character of Curve: Bell Shape     DESCRIPTION OF PROCEDURE:  After informed consent was obtained, the patient was brought to the procedure room where he was placed in the supine position with all pressure points well padded.  He was prepped and draped in a sterile fashion. A flexible cystoscope was introduced through a well-lubricated urethra.  The anterior urethra up to the point of reconstruction was normal in appearance. At the site of reconstruction there was not evidence of stricture recurrence. The narrowest caliber of the urethra at the reconstruction was estimated to be 28F and this was located in proximal to mid bulbar urethra. The flexible cystoscope passed easily. The voluntary sphincter was identified and the scope withdrawn.    ASSESSMENT AND PLAN:  Excellent outcome after urethroplasty.   For the ED patient will try higher doses of sildenafil and follow-up in a month with a video visit.   If he does not have a good response then he is open to either vacuum erection device or intracavernosal injection therapy.  We also briefly discussed the option of arterial bypass graft if these do not work.    For the stricture, the patient will follow-up gita Schultz MD

## 2020-10-23 ENCOUNTER — TELEPHONE (OUTPATIENT)
Dept: UROLOGY | Facility: CLINIC | Age: 35
End: 2020-10-23

## 2020-10-23 NOTE — TELEPHONE ENCOUNTER
Prior Authorization Retail Medication Request    Medication/Dose: Sildenafil Citrate 20 mg  ICD code (if different than what is on RX):  Erectile Dysfunction:N52.9  Previously Tried and Failed:  none  Rationale:  To help obtain an erection for sexual activity    Insurance Name:  [employer] [place]  Insurance ID:  [id number]      Pharmacy Information (if different than what is on RX)  Name:  [facility] pharmacy  Phone:  [phone]

## 2020-10-26 NOTE — TELEPHONE ENCOUNTER
PRIOR AUTHORIZATION DENIED    Medication: Sildenafil Citrate 20 mg-PA DENIED     Denial Date: 10/26/2020    Denial Rational:           Appeal Information:

## 2020-10-26 NOTE — TELEPHONE ENCOUNTER
Central Prior Authorization Team   [phone]    PA Initiation    Medication: Sildenafil Citrate 20 mg  Insurance Company: [employer] [place] - Phone [phone] Fax [phone]  Pharmacy Filling the Rx: [id number] - [place], [place] - [address] AT [facility] [address]  Filling Pharmacy Phone: [phone]  Filling Pharmacy Fax: [phone]  Start Date: 10/26/2020

## 2020-11-07 ENCOUNTER — HEALTH MAINTENANCE LETTER (OUTPATIENT)
Age: 35
End: 2020-11-07

## 2020-11-13 ENCOUNTER — PRE VISIT (OUTPATIENT)
Dept: UROLOGY | Facility: CLINIC | Age: 35
End: 2020-11-13

## 2020-11-13 NOTE — TELEPHONE ENCOUNTER
Reason for visit: discuss medication results for ED     Relevant information: history of urethroplasty for urethral stricture    Records/imaging/labs/orders: all records available    Pt called: no need for a call

## 2020-11-23 ENCOUNTER — VIRTUAL VISIT (OUTPATIENT)
Dept: UROLOGY | Facility: CLINIC | Age: 35
End: 2020-11-23
Payer: COMMERCIAL

## 2020-11-23 DIAGNOSIS — N52.8 OTHER MALE ERECTILE DYSFUNCTION: Primary | ICD-10-CM

## 2020-11-23 PROCEDURE — 99213 OFFICE O/P EST LOW 20 MIN: CPT | Mod: 95 | Performed by: UROLOGY

## 2020-11-23 NOTE — PROGRESS NOTES
HPI:  Ricky English is a 35 year old male  with good stricture outcome after EPA for straddle injury but persistent ED despite 100mg sildenafil. Erections were 95% after accident and now 50% after urethroplasty.     PMH: none    Past Surgical History:   Procedure Laterality Date     URETHROPLASTY N/A 10/2/2019    Procedure: Posterior Urethroplasty (No Buccal Graft Planned);  Surgeon: Glenn Schultz MD;  Location:  OR     Current Outpatient Medications   Medication     cholecalciferol (VITAMIN D3) 400 unit (10 mcg) TABS tablet     COSENTYX SENSOREADY 300 DOSE 150 MG/ML Sensoready pen     fish oil-omega-3 fatty acids 1000 MG capsule     glucosamine-chondroitin (GLUCOSAMINE CHONDR COMPLEX) 500-400 MG CAPS per capsule     Multiple Vitamin (DAILY VITAMINS) TABS     omeprazole (PRILOSEC) 40 MG DR capsule     sildenafil (VIAGRA) 50 MG tablet     penicillin V (VEETID) 500 MG tablet     sildenafil (REVATIO) 20 MG tablet     No current facility-administered medications for this visit.         Allergies   Allergen Reactions     Ceclor [Cefaclor]      Tegretol [Carbamazepine]      FH: No family history of urologic problems similar to those being experienced by the patient.   Social History     Socioeconomic History     Marital status:      Spouse name: Not on file     Number of children: Not on file     Years of education: Not on file     Highest education level: Not on file   Occupational History     Not on file   Social Needs     Financial resource strain: Not on file     Food insecurity     Worry: Not on file     Inability: Not on file     Transportation needs     Medical: Not on file     Non-medical: Not on file   Tobacco Use     Smoking status: Never Smoker     Smokeless tobacco: Never Used   Substance and Sexual Activity     Alcohol use: Yes     Comment: A couple beers per week     Drug use: Yes     Types: Marijuana     Comment: Once a month     Sexual activity: Not on file   Lifestyle     Physical  activity     Days per week: Not on file     Minutes per session: Not on file     Stress: Not on file   Relationships     Social connections     Talks on phone: Not on file     Gets together: Not on file     Attends Taoist service: Not on file     Active member of club or organization: Not on file     Attends meetings of clubs or organizations: Not on file     Relationship status: Not on file     Intimate partner violence     Fear of current or ex partner: Not on file     Emotionally abused: Not on file     Physically abused: Not on file     Forced sexual activity: Not on file   Other Topics Concern     Not on file   Social History Narrative     Not on file      ROS: 10 point ROS neg other than the symptoms noted above in the HPI.    GENERAL: Healthy, alert and no distress  EYES: Eyes grossly normal to inspection.  No discharge or erythema, or obvious scleral/conjunctival abnormalities.  RESP: No audible wheeze, cough, or visible cyanosis.  No visible retractions or increased work of breathing.    SKIN: Visible skin clear. No significant rash, abnormal pigmentation or lesions.  NEURO: Cranial nerves grossly intact.  Mentation and speech appropriate for age.  PSYCH: Mentation appears normal, affect normal/bright, judgement and insight intact, normal speech and appearance well-groomed.    Imaging: none    Labs: none    A/P: 36 y/o man with ED after urethroplasty after straddle injury. We discussed DDx of neural vs. vasc injury and options of JUSTIN vs. ICI vs revascularization.  He prefers JUSTIN. Will need to arrange office visit with the rep. This will need to wait until after COVID surge.

## 2020-11-23 NOTE — NURSING NOTE
Chief Complaint   Patient presents with     Follow Up     Discuss results       There were no vitals taken for this visit. There is no height or weight on file to calculate BMI.    Patient Active Problem List   Diagnosis     Heartburn     Hx of colonic polyp     Pelvic straddle injury     Perineal hematoma     Psoriatic arthritis (H)     Urethral injury, closed, initial encounter     Gastroesophageal reflux disease       Allergies   Allergen Reactions     Ceclor [Cefaclor]      Tegretol [Carbamazepine]        Current Outpatient Medications   Medication Sig Dispense Refill     cholecalciferol (VITAMIN D3) 400 unit (10 mcg) TABS tablet        COSENTYX SENSOREADY 300 DOSE 150 MG/ML Sensoready pen   10     fish oil-omega-3 fatty acids 1000 MG capsule        glucosamine-chondroitin (GLUCOSAMINE CHONDR COMPLEX) 500-400 MG CAPS per capsule        Multiple Vitamin (DAILY VITAMINS) TABS        omeprazole (PRILOSEC) 40 MG DR capsule TK 1 C PO QD IN THE MORNING OES  3     penicillin V (VEETID) 500 MG tablet TK 1 T PO  QID       sildenafil (REVATIO) 20 MG tablet Take 1 tablet (20 mg) by mouth as needed (erectile dysfunction) 10 tablet 3     sildenafil (VIAGRA) 50 MG tablet Take 1 tablet (50 mg) by mouth daily as needed (erectile dysfunction) 30 tablet 3       Social History     Tobacco Use     Smoking status: Never Smoker     Smokeless tobacco: Never Used   Substance Use Topics     Alcohol use: Yes     Comment: A couple beers per week     Drug use: Yes     Types: Marijuana     Comment: Once a month       Inez Solomon, EMT  11/23/2020  9:01 AM

## 2020-11-23 NOTE — PROGRESS NOTES
"Video Visit Technology for this patient: David not working, patient has smart device, please try [employer] Video with patient    Ricky English is a 35 year old male who is being evaluated via a billable video visit.      The patient has been notified of following:     \"This video visit will be conducted via a call between you and your physician/provider. We have found that certain health care needs can be provided without the need for an in-person physical exam.  This service lets us provide the care you need with a video conversation.  If a prescription is necessary we can send it directly to your pharmacy.  If lab work is needed we can place an order for that and you can then stop by our lab to have the test done at a later time.    Video visits are billed at different rates depending on your insurance coverage.  Please reach out to your insurance provider with any questions.    If during the course of the call the physician/provider feels a video visit is not appropriate, you will not be charged for this service.\"    Patient has given verbal consent for Video visit? Yes  How would you like to obtain your AVS? MyChart  If you are dropped from the video visit, the video invite should be resent to: Send to e-mail at: [email or web address]  Will anyone else be joining your video visit? No      Video-Visit Details    Type of service:  Video Visit    Video Start Time: 9:15  Video End Time: 9:37 AM    Originating Location (pt. Location): Home    Distant Location (provider location):  [facility] UROLOGY [facility]     Platform used for Video Visit: [email or web address]    Glenn Schultz MD        "

## 2020-11-23 NOTE — LETTER
"11/23/2020       RE: Ricky English  [address]  [place] [facility] [address]     Dear Colleague,    Thank you for referring your patient, Ricky English, to the [facility] UROLOGY CLINIC [place] at [facility]. Please see a copy of my visit note below.    Video Visit Technology for this patient: [employer] not working, patient has smart device, please try [employer] Video with patient    Ricky English is a 35 year old male who is being evaluated via a billable video visit.      The patient has been notified of following:     \"This video visit will be conducted via a call between you and your physician/provider. We have found that certain health care needs can be provided without the need for an in-person physical exam.  This service lets us provide the care you need with a video conversation.  If a prescription is necessary we can send it directly to your pharmacy.  If lab work is needed we can place an order for that and you can then stop by our lab to have the test done at a later time.    Video visits are billed at different rates depending on your insurance coverage.  Please reach out to your insurance provider with any questions.    If during the course of the call the physician/provider feels a video visit is not appropriate, you will not be charged for this service.\"    Patient has given verbal consent for Video visit? Yes  How would you like to obtain your AVS? MyChart  If you are dropped from the video visit, the video invite should be resent to: Send to e-mail at: [email or web address]  Will anyone else be joining your video visit? No      Video-Visit Details    Type of service:  Video Visit    Video Start Time: 9:15  Video End Time: 9:37 AM    Originating Location (pt. Location): Home    Distant Location (provider location):  [facility] UROLOGY [facility]     Platform used for Video Visit: Trang Schultz, " MD          HPI:  Ricky English is a 35 year old male  with good stricture outcome after EPA for straddle injury but persistent ED despite 100mg sildenafil. Erections were 95% after accident and now 50% after urethroplasty.     PMH: none    Past Surgical History:   Procedure Laterality Date     URETHROPLASTY N/A 10/2/2019    Procedure: Posterior Urethroplasty (No Buccal Graft Planned);  Surgeon: Glenn Schultz MD;  Location:  OR     Current Outpatient Medications   Medication     cholecalciferol (VITAMIN D3) 400 unit (10 mcg) TABS tablet     COSENTYX SENSOREADY 300 DOSE 150 MG/ML Sensoready pen     fish oil-omega-3 fatty acids 1000 MG capsule     glucosamine-chondroitin (GLUCOSAMINE CHONDR COMPLEX) 500-400 MG CAPS per capsule     Multiple Vitamin (DAILY VITAMINS) TABS     omeprazole (PRILOSEC) 40 MG DR capsule     sildenafil (VIAGRA) 50 MG tablet     penicillin V (VEETID) 500 MG tablet     sildenafil (REVATIO) 20 MG tablet     No current facility-administered medications for this visit.         Allergies   Allergen Reactions     Ceclor [Cefaclor]      Tegretol [Carbamazepine]      FH: No family history of urologic problems similar to those being experienced by the patient.   Social History     Socioeconomic History     Marital status:      Spouse name: Not on file     Number of children: Not on file     Years of education: Not on file     Highest education level: Not on file   Occupational History     Not on file   Social Needs     Financial resource strain: Not on file     Food insecurity     Worry: Not on file     Inability: Not on file     Transportation needs     Medical: Not on file     Non-medical: Not on file   Tobacco Use     Smoking status: Never Smoker     Smokeless tobacco: Never Used   Substance and Sexual Activity     Alcohol use: Yes     Comment: A couple beers per week     Drug use: Yes     Types: Marijuana     Comment: Once a month     Sexual activity: Not on file   Lifestyle      Physical activity     Days per week: Not on file     Minutes per session: Not on file     Stress: Not on file   Relationships     Social connections     Talks on phone: Not on file     Gets together: Not on file     Attends Adventist service: Not on file     Active member of club or organization: Not on file     Attends meetings of clubs or organizations: Not on file     Relationship status: Not on file     Intimate partner violence     Fear of current or ex partner: Not on file     Emotionally abused: Not on file     Physically abused: Not on file     Forced sexual activity: Not on file   Other Topics Concern     Not on file   Social History Narrative     Not on file      ROS: 10 point ROS neg other than the symptoms noted above in the HPI.    GENERAL: Healthy, alert and no distress  EYES: Eyes grossly normal to inspection.  No discharge or erythema, or obvious scleral/conjunctival abnormalities.  RESP: No audible wheeze, cough, or visible cyanosis.  No visible retractions or increased work of breathing.    SKIN: Visible skin clear. No significant rash, abnormal pigmentation or lesions.  NEURO: Cranial nerves grossly intact.  Mentation and speech appropriate for age.  PSYCH: Mentation appears normal, affect normal/bright, judgement and insight intact, normal speech and appearance well-groomed.    Imaging: none    Labs: none    A/P: 34 y/o man with ED after urethroplasty after straddle injury. We discussed DDx of neural vs. vasc injury and options of JUSTIN vs. ICI vs revascularization.  He prefers JUSTIN. Will need to arrange office visit with the rep. This will need to wait until after COVID surge.    Again, thank you for allowing me to participate in the care of your patient.      Sincerely,    Glenn Schultz MD

## 2021-09-05 ENCOUNTER — HEALTH MAINTENANCE LETTER (OUTPATIENT)
Age: 36
End: 2021-09-05

## 2021-12-26 ENCOUNTER — HEALTH MAINTENANCE LETTER (OUTPATIENT)
Age: 36
End: 2021-12-26

## 2022-10-23 ENCOUNTER — HEALTH MAINTENANCE LETTER (OUTPATIENT)
Age: 37
End: 2022-10-23

## 2023-04-02 ENCOUNTER — HEALTH MAINTENANCE LETTER (OUTPATIENT)
Age: 38
End: 2023-04-02

## (undated) DEVICE — SU VICRYL 3-0 SH 27" UND J416H

## (undated) DEVICE — CONNECTOR WATER VALVE PERFUSION PACK STR 020272801

## (undated) DEVICE — SUCTION MANIFOLD NEPTUNE 2 SYS 4 PORT 0702-020-000

## (undated) DEVICE — ESU PENCIL W/COATED BLADE E2450H

## (undated) DEVICE — BAG URINARY DRAIN LUBRISIL IC 4000ML LF 253509A

## (undated) DEVICE — PACK ENT MINOR CUSTOM ASC

## (undated) DEVICE — LINEN TOWEL PACK X6 WHITE 5487

## (undated) DEVICE — SOL NACL 0.9% INJ 1000ML BAG 2B1324X

## (undated) DEVICE — ESU GROUND PAD ADULT W/CORD E7507

## (undated) DEVICE — TUBING SUCTION 12"X1/4" N612

## (undated) DEVICE — DRAPE STERI TOWEL LG 1010

## (undated) DEVICE — PAD CHUX UNDERPAD 30X30"

## (undated) DEVICE — SUTURE BOOTS 051003PBX

## (undated) DEVICE — Device

## (undated) DEVICE — TUBING IRRIG CYSTO/BLADDER SET 81" LF 2C4040

## (undated) DEVICE — CATH PLUG W/CAP 000076

## (undated) DEVICE — SU CHROMIC 4-0 SH 27" G121H

## (undated) DEVICE — DRSG KERLIX FLUFFS X5

## (undated) DEVICE — SYR 50ML LL W/O NDL 309653

## (undated) DEVICE — GLOVE PROTEXIS W/NEU-THERA 7.5  2D73TE75

## (undated) DEVICE — SU MONOCRYL 4-0 PS-2 27" UND Y426H

## (undated) DEVICE — PREP POVIDONE IODINE SOLUTION 10% 120ML

## (undated) DEVICE — SU SILK 2-0 SH CR 5X18" C0125

## (undated) DEVICE — LINEN GOWN XLG 5407

## (undated) DEVICE — SOL NACL 0.9% IRRIG 1000ML BOTTLE 2F7124

## (undated) DEVICE — PREP CHLORAPREP 26ML TINTED ORANGE  260815

## (undated) DEVICE — CATH FOLYSIL 16FR 15ML AA6116

## (undated) DEVICE — ENDO SEAL BX PORT BPS-A

## (undated) DEVICE — JELLY LUBRICATING SURGILUBE 2OZ TUBE

## (undated) DEVICE — LINEN TOWEL PACK X5 5464

## (undated) DEVICE — SOL WATER IRRIG 500ML BOTTLE 2F7113

## (undated) DEVICE — SPONGE BALL KERLIX ROUND XL W/O STRING LATEX 4935

## (undated) DEVICE — BLADE CLIPPER SGL USE 9680

## (undated) DEVICE — SU PDS II 5-0 RB-1 DA 30" Z320H

## (undated) DEVICE — SU VICRYL 4-0 RB-1 27" UND J214H

## (undated) DEVICE — DRAPE POUCH INSTRUMENT 1018

## (undated) DEVICE — GLOVE PROTEXIS W/NEU-THERA 8.0  2D73TE80

## (undated) DEVICE — SYR BULB IRRIG 50ML LATEX FREE 0035280

## (undated) DEVICE — EYE PREP BETADINE 5% SOLUTION 30ML 0065-0411-30

## (undated) DEVICE — DRAPE LEGGINGS CLEAR 8430

## (undated) DEVICE — TOOTHBRUSH ADULT NON STERILE MDS136850

## (undated) DEVICE — SU ETHILON 3-0 FS-1 18" 663G

## (undated) DEVICE — GUIDEWIRE SENSOR DUAL FLEX STR 0.035"X150CM M0066703080

## (undated) DEVICE — KIT ENDO FIRST STEP DISINFECTANT 200ML W/POUCH EP-4

## (undated) DEVICE — DRAPE GYN/UROLOGY FLUID POUCH TUR 29455

## (undated) RX ORDER — PROPOFOL 10 MG/ML
INJECTION, EMULSION INTRAVENOUS
Status: DISPENSED
Start: 2019-10-02

## (undated) RX ORDER — LIDOCAINE HYDROCHLORIDE 20 MG/ML
JELLY TOPICAL
Status: DISPENSED
Start: 2019-08-05

## (undated) RX ORDER — HYDROMORPHONE HYDROCHLORIDE 1 MG/ML
INJECTION, SOLUTION INTRAMUSCULAR; INTRAVENOUS; SUBCUTANEOUS
Status: DISPENSED
Start: 2019-10-02

## (undated) RX ORDER — VANCOMYCIN HYDROCHLORIDE 1 G/20ML
INJECTION, POWDER, LYOPHILIZED, FOR SOLUTION INTRAVENOUS
Status: DISPENSED
Start: 2019-10-02

## (undated) RX ORDER — LIDOCAINE HYDROCHLORIDE 20 MG/ML
INJECTION, SOLUTION EPIDURAL; INFILTRATION; INTRACAUDAL; PERINEURAL
Status: DISPENSED
Start: 2019-10-02

## (undated) RX ORDER — LIDOCAINE HYDROCHLORIDE 20 MG/ML
JELLY TOPICAL
Status: DISPENSED
Start: 2020-01-20

## (undated) RX ORDER — ACETAMINOPHEN 325 MG/1
TABLET ORAL
Status: DISPENSED
Start: 2019-10-02

## (undated) RX ORDER — KETOROLAC TROMETHAMINE 30 MG/ML
INJECTION, SOLUTION INTRAMUSCULAR; INTRAVENOUS
Status: DISPENSED
Start: 2019-10-02

## (undated) RX ORDER — CHLORHEXIDINE GLUCONATE ORAL RINSE 1.2 MG/ML
SOLUTION DENTAL
Status: DISPENSED
Start: 2019-10-02

## (undated) RX ORDER — DEXAMETHASONE SODIUM PHOSPHATE 4 MG/ML
INJECTION, SOLUTION INTRA-ARTICULAR; INTRALESIONAL; INTRAMUSCULAR; INTRAVENOUS; SOFT TISSUE
Status: DISPENSED
Start: 2019-10-02

## (undated) RX ORDER — CIPROFLOXACIN 500 MG/1
TABLET, FILM COATED ORAL
Status: DISPENSED
Start: 2019-08-19

## (undated) RX ORDER — VANCOMYCIN HYDROCHLORIDE 500 MG/10ML
INJECTION, POWDER, LYOPHILIZED, FOR SOLUTION INTRAVENOUS
Status: DISPENSED
Start: 2019-10-02

## (undated) RX ORDER — EPINEPHRINE 1 MG/ML
INJECTION, SOLUTION, CONCENTRATE INTRAVENOUS
Status: DISPENSED
Start: 2019-10-02

## (undated) RX ORDER — BUPIVACAINE HYDROCHLORIDE 5 MG/ML
INJECTION, SOLUTION EPIDURAL; INTRACAUDAL
Status: DISPENSED
Start: 2019-10-02

## (undated) RX ORDER — ONDANSETRON 2 MG/ML
INJECTION INTRAMUSCULAR; INTRAVENOUS
Status: DISPENSED
Start: 2019-10-02

## (undated) RX ORDER — FENTANYL CITRATE 50 UG/ML
INJECTION, SOLUTION INTRAMUSCULAR; INTRAVENOUS
Status: DISPENSED
Start: 2019-10-02

## (undated) RX ORDER — GENTAMICIN 40 MG/ML
INJECTION, SOLUTION INTRAMUSCULAR; INTRAVENOUS
Status: DISPENSED
Start: 2019-10-02

## (undated) RX ORDER — OXYCODONE HYDROCHLORIDE 5 MG/1
TABLET ORAL
Status: DISPENSED
Start: 2019-10-02

## (undated) RX ORDER — CIPROFLOXACIN 500 MG/1
TABLET, FILM COATED ORAL
Status: DISPENSED
Start: 2019-09-23

## (undated) RX ORDER — LIDOCAINE HYDROCHLORIDE 20 MG/ML
JELLY TOPICAL
Status: DISPENSED
Start: 2019-09-23

## (undated) RX ORDER — GABAPENTIN 300 MG/1
CAPSULE ORAL
Status: DISPENSED
Start: 2019-10-02